# Patient Record
Sex: FEMALE | Race: WHITE | Employment: FULL TIME | URBAN - METROPOLITAN AREA
[De-identification: names, ages, dates, MRNs, and addresses within clinical notes are randomized per-mention and may not be internally consistent; named-entity substitution may affect disease eponyms.]

---

## 2020-08-29 ENCOUNTER — HOSPITAL ENCOUNTER (OUTPATIENT)
Dept: GENERAL RADIOLOGY | Age: 25
Discharge: HOME | End: 2020-08-31
Payer: COMMERCIAL

## 2020-08-29 ENCOUNTER — HOSPITAL ENCOUNTER (OUTPATIENT)
Age: 25
Discharge: HOME | End: 2020-08-31
Payer: COMMERCIAL

## 2020-08-29 PROCEDURE — 70220 X-RAY EXAM OF SINUSES: CPT

## 2020-11-06 ENCOUNTER — OFFICE VISIT (OUTPATIENT)
Dept: SURGERY | Age: 25
End: 2020-11-06
Payer: COMMERCIAL

## 2020-11-06 VITALS
TEMPERATURE: 99.4 F | RESPIRATION RATE: 20 BRPM | BODY MASS INDEX: 28.13 KG/M2 | OXYGEN SATURATION: 97 % | DIASTOLIC BLOOD PRESSURE: 82 MMHG | SYSTOLIC BLOOD PRESSURE: 116 MMHG | HEIGHT: 69 IN | HEART RATE: 91 BPM | WEIGHT: 189.9 LBS

## 2020-11-06 PROCEDURE — 99204 OFFICE O/P NEW MOD 45 MIN: CPT | Performed by: PLASTIC SURGERY

## 2020-11-06 PROCEDURE — 1036F TOBACCO NON-USER: CPT | Performed by: PLASTIC SURGERY

## 2020-11-06 PROCEDURE — G8427 DOCREV CUR MEDS BY ELIG CLIN: HCPCS | Performed by: PLASTIC SURGERY

## 2020-11-06 PROCEDURE — G8484 FLU IMMUNIZE NO ADMIN: HCPCS | Performed by: PLASTIC SURGERY

## 2020-11-06 PROCEDURE — G8419 CALC BMI OUT NRM PARAM NOF/U: HCPCS | Performed by: PLASTIC SURGERY

## 2020-11-06 NOTE — PROGRESS NOTES
Department of Plastic Surgery - Adult  Attending Consult Note        CHIEF COMPLAINT: Breathing difficulties    History Obtained From:  patient    HISTORY OF PRESENT ILLNESS:                The patient is a 22 y.o. female who presents with concerns of difficulty breathing through her nose. It seems that both sides are restrictive. Patient also has cosmetic complaints about a dorsal hump and was hoping to have this addressed at the time of any indicated nasal airway surgery. The patient states she has been seen by an ear nose and throat specialist who recommended a septoplasty and bilateral inferior turbinectomy. She did not discuss any external nasal changes with the ENT. Patient states she has taken Flonase in the past, but did not tolerate it and currently uses Nasacort. She states she has a some improvement with this nasal steroid but still has significant airway compromise. Furthermore, she did have allergy testing indicating that she is allergic to a multitude of trees and their pollens. Patient has considered injections for this issue, but decided not to pursue it. Past Medical History:    Past Medical History:   Diagnosis Date    Known health problems: none     none per pt     Past Surgical History:    Past Surgical History:   Procedure Laterality Date    TONSILLECTOMY AND ADENOIDECTOMY      1997 per pt    WISDOM TOOTH EXTRACTION      2014 apx per pt     Current Medications:   No current facility-administered medications for this visit. Allergies:  Patient has no known allergies.     Social History:   Social History     Socioeconomic History    Marital status: Single     Spouse name: Not on file    Number of children: Not on file    Years of education: Not on file    Highest education level: Not on file   Occupational History    Not on file   Social Needs    Financial resource strain: Not on file    Food insecurity     Worry: Not on file     Inability: Not on file   [employer] needs     Medical: Not on file     Non-medical: Not on file   Tobacco Use    Smoking status: Never Smoker    Smokeless tobacco: Never Used   Substance and Sexual Activity    Alcohol use: No    Drug use: No    Sexual activity: Never   Lifestyle    Physical activity     Days per week: Not on file     Minutes per session: Not on file    Stress: Not on file   Relationships    Social connections     Talks on phone: Not on file     Gets together: Not on file     Attends [employer] service: Not on file     Active member of club or organization: Not on file     Attends meetings of clubs or organizations: Not on file     Relationship status: Not on file    Intimate partner violence     Fear of current or ex partner: Not on file     Emotionally abused: Not on file     Physically abused: Not on file     Forced sexual activity: Not on file   Other Topics Concern    Not on file   Social History Narrative    Not on file     Family History:   Family History   Problem Relation Age of Onset    No Known Problems Mother     No Known Problems Father        REVIEW OF SYSTEMS:    CONSTITUTIONAL:  negative for  fevers, chills, sweats and fatigue  EYES: negative for dipolpia or acute vision loss. RESPIRATORY:  negative for  dry cough, cough with sputum, dyspnea, wheezing and chest pain  CARDIOVASCULAR:  negative for  chest pain, dyspnea, palpitations, syncope  GASTROINTESTINAL:  negative for nausea, vomiting, change in bowel habits, diarrhea, constipation and abdominal pain  EXTREMITIES: negative for edema  MUSCULOSKELETAL: negative for muscle weakness  SKIN: negative for itching or rashes. BEHAVIOR/PSYCH:  negative for poor appetite, increased appetite, decreased sleep and poor concentration  NEURO: Occasional migraine headaches, sinus headaches.         PHYSICAL EXAM:    VITALS:  /82 (Site: Left Upper Arm, Position: Sitting, Cuff Size: Medium Adult)   Pulse 91   Temp 99.4 °F (37.4 °C) (Temporal)   Resp 20   Ht 5' 9\" (1.753 m)   Wt 189 lb 14.4 oz (86.1 kg)   LMP 10/26/2020   SpO2 97%   BMI 28.04 kg/m²       Physical Exam   Constitutional: She is oriented to person, place, and time. She appears well-developed and well-nourished. Neck: Normal range of motion. Neck supple. Cardiovascular: Normal rate, regular rhythm, normal heart sounds and intact distal pulses. Pulmonary/Chest: Effort normal and breath sounds normal.   Abdominal: Soft. Bowel sounds are normal. She exhibits no mass. No tenderness. Musculoskeletal: Normal range of motion. She exhibits no edema. Lymphadenopathy: She has no cervical adenopathy. Neurological: She is alert and oriented to person, place, and time. She has normal reflexes. Skin: Warm and dry. Neuro: Cranial nerves II-XII grossly intact. EXTERNAL NASAL SPECULUM EXAM : presence of deformity in the nasal area, Nasal bridge widened, Nasal Dorsum hump, Nasal Tip bulbous, Alae widened, Columella midline, Nasal Bones widened. Deviated septum by nasal speculum exam to the right side and an S shaped pattern  Turbinate hypertrophy and bilateral inferior turbinates  Airway reduction on the both side  Kenedy maneuver was airflow  No septal perforations or hematomas  Patency of nasal passages compromised  Degree of obstruction to the left nasal airway 75%  Degree of obstruction to the right nasal airway 75%      DATA:      Radiology Review: X-ray of sinuses indicating septal deviation. IMPRESSION/RECOMMENDATIONS:        Clinical evidence of bilateral turbinate hypertrophy and septal deviation. I would like a CT scan of the nasal's sinuses in order to determine the extent of these issues as well as possible septal spurs that may need to be addressed. Furthermore, the patient is a candidate for cosmetic improvement of the nose including reduction of the dorsal hump, narrowing the widened nasal bones, refining the tip and slightly elevating the tip.   Furthermore, the patient would benefit from narrowing of the alar cartilages. Patient states she is interested in the functional nasal improvement as well as the cosmetic improvement. We will prepare life size photographs to educate her on the goals of surgery. I would meet with her again once the CAT scan of the sinuses are performed to also further review this portion of her surgery. The procedure is medically necessary due to significant airway compromise    Life Size Photos Taken Chaperone present      This document is generated, in part, by voice recognition software and thus  syntax and grammatical errors are possible.     Rita Alcaraz  11:39 AM  11/10/2020

## 2020-11-19 ENCOUNTER — HOSPITAL ENCOUNTER (OUTPATIENT)
Dept: CT IMAGING | Age: 25
Discharge: HOME | End: 2020-11-21
Payer: COMMERCIAL

## 2020-11-19 PROCEDURE — 70488 CT MAXILLOFACIAL W/O & W/DYE: CPT

## 2020-11-19 PROCEDURE — 6360000004 HC RX CONTRAST MEDICATION: Performed by: RADIOLOGY

## 2020-11-19 RX ADMIN — IOPAMIDOL 75 ML: 755 INJECTION, SOLUTION INTRAVENOUS at 17:46

## 2020-11-20 PROBLEM — R87.612 LGSIL OF CERVIX OF UNDETERMINED SIGNIFICANCE: Status: ACTIVE | Noted: 2020-11-20

## 2020-11-20 PROBLEM — R87.810 CERVICAL HIGH RISK HPV (HUMAN PAPILLOMAVIRUS) TEST POSITIVE: Status: ACTIVE | Noted: 2020-11-20

## 2020-11-25 ENCOUNTER — TELEPHONE (OUTPATIENT)
Dept: SURGERY | Age: 25
End: 2020-11-25

## 2020-11-25 NOTE — TELEPHONE ENCOUNTER
Reviewed CT scan of face. Shows rightward septal deviation with septal spur. Septal deviation is obstructing 80% of the right airway. Furthermore, patient has bilateral inferior turbinate hypertrophy which further obstructs the airway to about 85% total on the right and 80% on the left. She does not exhibit any acute nasal bone fractures. We will proceed with authorizing a septoplasty with bilateral inferior turbinate resection in an effort to improve her nasal airway. This document is generated, in part, by voice recognition software and thus  syntax and grammatical errors are possible.     Lyn Estrella  12:05 PM  11/25/2020

## 2020-12-03 ENCOUNTER — TELEPHONE (OUTPATIENT)
Dept: SURGERY | Age: 25
End: 2020-12-03

## 2020-12-10 NOTE — TELEPHONE ENCOUNTER
I did document my CT findings on 11/25/2020 in a telephone encounter. We should be able to use this for the insurance company.     Tierra Mariscal

## 2020-12-23 ENCOUNTER — OFFICE VISIT (OUTPATIENT)
Dept: SURGERY | Age: 25
End: 2020-12-23
Payer: COMMERCIAL

## 2020-12-23 VITALS
BODY MASS INDEX: 26.66 KG/M2 | HEART RATE: 68 BPM | DIASTOLIC BLOOD PRESSURE: 66 MMHG | RESPIRATION RATE: 14 BRPM | TEMPERATURE: 98.4 F | HEIGHT: 69 IN | WEIGHT: 180 LBS | OXYGEN SATURATION: 98 % | SYSTOLIC BLOOD PRESSURE: 104 MMHG

## 2020-12-23 DIAGNOSIS — M95.0 NASAL DEFORMITY: Primary | ICD-10-CM

## 2020-12-23 PROCEDURE — 99212 OFFICE O/P EST SF 10 MIN: CPT | Performed by: PLASTIC SURGERY

## 2020-12-23 PROCEDURE — G8484 FLU IMMUNIZE NO ADMIN: HCPCS | Performed by: PLASTIC SURGERY

## 2020-12-23 PROCEDURE — G8419 CALC BMI OUT NRM PARAM NOF/U: HCPCS | Performed by: PLASTIC SURGERY

## 2020-12-23 PROCEDURE — 1036F TOBACCO NON-USER: CPT | Performed by: PLASTIC SURGERY

## 2020-12-23 PROCEDURE — G8427 DOCREV CUR MEDS BY ELIG CLIN: HCPCS | Performed by: PLASTIC SURGERY

## 2020-12-23 NOTE — PROGRESS NOTES
Subjective: Follow up today from initial presentation of restricted nasal airway. Denies fever, nausea, vomiting, leg pain or swelling, pain is absent. The patient has recently obtained a CT facial bone scan and presents today to have these images reviewed. The patient has trialed a 2-week duration of Flonase as well with no relief of symptomatology. They voice no changes in her previous symptoms of their restricted nasal airway since there initial office visit. PHYSICAL EXAM:    VITALS:  /66 (Site: Left Upper Arm, Position: Sitting, Cuff Size: Medium Adult)   Pulse 68   Temp 98.4 °F (36.9 °C) (Temporal)   Resp 14   Ht 5' 9\" (1.753 m)   Wt 180 lb (81.6 kg)   LMP 12/03/2020 (Approximate)   SpO2 98%   Breastfeeding No   BMI 26.58 kg/m²   CONSTITUTIONAL:  awake, alert, cooperative, no apparent distress, and appears stated age  LUNGS:  No increased work of breathing, good air exchange, clear to auscultation bilaterally, no crackles or wheezing  CARDIOVASCULAR:  Normal apical impulse, regular rate and rhythm, normal S1 and S2, no S3 or S4, and no murmur noted    Nasal exam remains unchanged from previous visit      DATA:      Radiology Review:    EXAMINATION:   CT OF THE FACE WITH AND WITHOUT CONTRAST 11/19/2020   TECHNIQUE:   CT of the face was performed with and without the administration of   intravenous contrast.  Multiplanar reformatted images are provided for   review. Dose modulation, iterative reconstruction, and/or weight based   adjustment of the mA/kV was utilized to reduce the radiation dose to as low   as reasonably achievable. COMPARISON:   None. HISTORY:   ORDERING SYSTEM PROVIDED HISTORY: Nasal deformity   FINDINGS:   PHARYNX/LARYNX: The palatine tonsils are normal in appearance.  The tongue is   normal in appearance.  The valleculae, epiglottis, aryepiglottic folds and   pyriform sinuses appear unremarkable.  No mass or abscess is seen.    SALIVARY GLANDS: The parotid and submandibular glands appear unremarkable. LYMPH NODES: No cervical lymphadenopathy is seen. SOFT TISSUES: No appreciable soft tissue swelling or mass is seen. BRAIN/ORBITS/SINUSES: The visualized portion of the intracranial contents   appear unremarkable.  The visualized portion of the orbits, paranasal sinuses   and mastoid air cells demonstrate no acute abnormality.  The nasal bones are   intact.  Slight deviation of the bony septum to the right.  Small defect in   the posterior nasal septum. BONES:  No acute osseous abnormality is seen.       Impression   No acute abnormality of the face. The nasal bones are intact. IMPRESSION/RECOMMENDATIONS:      Diagnosis -nasal airway obstruction, hypertrophy of bilateral inferior turbinates, unacceptable cosmetic appearance of nose    The procedure is medically necessary due to nasal airway compromise refractory to nasal sprays including Flonase      As the patient has tried and failed conservative therapy for the restricted nasal airway including a duration of Flonase for greater than 8 weeks I believe the patient would benefit from the below described. As the patient has had prolonged persistent obstructed nasal breathing due to nasal bone and septal deviation that are the primary causes of anatomic mechanical airway obstruction and the nasal airway obstruction cannot be corrected by septoplasty alone as documented in the medical record I believe surgical intervention would be most optimal for their corrective procedure. The patient's physical exam and photos clearly document the nasal bone and septal deviation as the primary cause of anatomic mechanical nasal airway obstruction and are consistent with the clinical exam as detailed above.     The described procedure below will be designed to correct the atomic goal mechanical nasal airway obstruction and relieve the nasal airway obstruction by centralizing nasal bone pyramid, also straightening the septum. As the patient has had: A history of nasal fracture with nasal bone displacement severe enough to cause nasal airway obstruction, history of nasal obstruction causing significant symptoms including their history of chronic rhinosinusitis and difficulty breathing as well as persistent symptoms despite conservative management for greater than 4 weeks. Procedure: Cosmetic rhinoplasty with medically necessary septoplasty and resection of enlarged bilateral inferior turbinates    I did review life size photographs with the patient indicating the peripheral post surgical plan. Patient understands this is in no way guarantee of results. The patient voices understanding they will plan to follow-up in our office after completion of prior authorization for life size photo review. The visit lasted greater than 25 minutes, with 50% of the time in counseling, education, review of the case, and coordination of care. The risks, benefits and options were discussed with the pt. The risks included but not limited to pain, bleeding, infection, heavy scarring, damage to surrounding structures, fluid collections, asymmetry, and need for further procedures. All of Her questions were answered to her satisfaction and She agrees to proceed with the operation. Follow Up after completion of prior authorization. This document is generated, in part, by voice recognition software and thus  syntax and grammatical errors are possible.     Flavia Diez  10:48 AM  1/6/2021

## 2020-12-29 NOTE — TELEPHONE ENCOUNTER
Surgery has been scheduled on 1/11/2021 at [facility] [place], [address], [place] [place]. Pre-Admission Testing will call you prior to surgery to inform you arrival time and any other additional directions, if they are unable to reach you, please call them two days prior at [phone]. If taking Fish Oil, Vitamins, two weeks prior to surgery stop taking. If taking NSAIDS (such as Aspirin, Ibuprofen)  anticoagulants please consult with your prescribing physician to get further instructions on when to stop medication prior to surgery that is scheduled, patient understood. Pre-Auth #: for CPT [id number] - approved auth # [id number], for CPT [id number] no auth needed ref # [id number]  CPT Codes: F5386015, 40310  ICD-10 Codes: J34.2, R09.81, J34.3, M95.0    [employer] paid 12/28/2020.

## 2021-01-04 NOTE — H&P
Department of Plastic Surgery - Adult   Attending Consult Note   CHIEF COMPLAINT: Breathing difficulties   History Obtained From: patient   HISTORY OF PRESENT ILLNESS:   The patient is a 22 y.o. female who presents with concerns of difficulty breathing through her nose. It seems that both sides are restrictive. Patient also has cosmetic complaints about a dorsal hump and was hoping to have this addressed at the time of any indicated nasal airway surgery. The patient states she has been seen by an ear nose and throat specialist who recommended a septoplasty and bilateral inferior turbinectomy. She did not discuss any external nasal changes with the ENT. Patient states she has taken Flonase in the past, but did not tolerate it and currently uses Nasacort. She states she has a some improvement with this nasal steroid but still has significant airway compromise. Furthermore, she did have allergy testing indicating that she is allergic to a multitude of trees and their pollens. Patient has considered injections for this issue, but decided not to pursue it. Past Medical History:   Past Medical History        Past Medical History:   Diagnosis Date    Known health problems: none     none per pt   Past Surgical History:   Past Surgical History                                            Current Medications:   Current Hospital Medications      Allergies: Patient has no known allergies. Social History:   Social History                                                                                                                                                                                                                                                                                Family History:   Family History                                  REVIEW OF SYSTEMS:   CONSTITUTIONAL: negative for fevers, chills, sweats and fatigue   EYES: negative for dipolpia or acute vision loss.    RESPIRATORY: negative for dry cough, cough with sputum, dyspnea, wheezing and chest pain   CARDIOVASCULAR: negative for chest pain, dyspnea, palpitations, syncope   GASTROINTESTINAL: negative for nausea, vomiting, change in bowel habits, diarrhea, constipation and abdominal pain   EXTREMITIES: negative for edema   MUSCULOSKELETAL: negative for muscle weakness   SKIN: negative for itching or rashes. BEHAVIOR/PSYCH: negative for poor appetite, increased appetite, decreased sleep and poor concentration   NEURO: Occasional migraine headaches, sinus headaches. PHYSICAL EXAM:   VITALS: /82 (Site: Left Upper Arm, Position: Sitting, Cuff Size: Medium Adult)  Pulse 91  Temp 99.4 °F (37.4 °C) (Temporal)  Resp 20  Ht 5' 9\" (1.753 m)  Wt 189 lb 14.4 oz (86.1 kg)  LMP 10/26/2020  SpO2 97%  BMI 28.04 kg/m²   Physical Exam   Constitutional: She is oriented to person, place, and time. She appears well-developed and well-nourished. Neck: Normal range of motion. Neck supple. Cardiovascular: Normal rate, regular rhythm, normal heart sounds and intact distal pulses. Pulmonary/Chest: Effort normal and breath sounds normal.   Abdominal: Soft. Bowel sounds are normal. She exhibits no mass. No tenderness. Musculoskeletal: Normal range of motion. She exhibits no edema. Lymphadenopathy: She has no cervical adenopathy. Neurological: She is alert and oriented to person, place, and time. She has normal reflexes. Skin: Warm and dry. Neuro: Cranial nerves II-XII grossly intact. EXTERNAL NASAL SPECULUM EXAM : presence of deformity in the nasal area, Nasal bridge widened, Nasal Dorsum hump, Nasal Tip bulbous, Alae widened, Columella midline, Nasal Bones widened.    Deviated septum by nasal speculum exam to the right side and an S shaped pattern   Turbinate hypertrophy and bilateral inferior turbinates   Airway reduction on the both side   Nadia maneuver was airflow   No septal perforations or hematomas   Patency of nasal passages compromised Degree of obstruction to the left nasal airway 75%   Degree of obstruction to the right nasal airway 75%   DATA:   Radiology Review: X-ray of sinuses indicating septal deviation. IMPRESSION/RECOMMENDATIONS:     Reviewed CT scan of face. Shows rightward septal deviation with septal spur. Septal deviation is obstructing 80% of the right airway. Furthermore, patient has bilateral inferior turbinate hypertrophy which further obstructs the airway to about 85% total on the right and 80% on the left. She does not exhibit any acute nasal bone fractures. Clinical evidence of bilateral turbinate hypertrophy and septal deviation. Furthermore, the patient is a candidate for cosmetic improvement of the nose including reduction of the dorsal hump, narrowing the widened nasal bones, refining the tip and slightly elevating the tip. Furthermore, the patient would benefit from narrowing of the alar cartilages. Patient states she is interested in the functional nasal improvement as well as the cosmetic improvement. The risks, benefits and options were discussed with the pt. The risks included but not limited to pain, bleeding, infection, heavy scarring, damage to surrounding structures, fluid collections, asymmetry, and need for further procedures. All of Her questions were answered to her satisfaction and She agrees to proceed with the operation. The procedure is medically necessary due to significant airway compromise   Life Size Photos Taken Chaperone present   This document is generated, in part, by voice recognition software and thus syntax and grammatical errors are possible. Attestation    No change in patient's H & P. I have reviewed the procedure with the patient as well as the risk and benefits. They have no further questions and agree to proceed with surgery.     Shell Fiore MD   8:27 AM  1/11/2021    Attestation    No change in patient's H & P. I have reviewed the procedure with the patient as well as the risk and benefits. They have no further questions and agree to proceed with surgery.     Deb Bautista MD   9:54 AM  1/14/2021

## 2021-01-05 ENCOUNTER — HOSPITAL ENCOUNTER (OUTPATIENT)
Age: 26
Discharge: HOME | End: 2021-01-07
Payer: COMMERCIAL

## 2021-01-05 PROCEDURE — U0003 INFECTIOUS AGENT DETECTION BY NUCLEIC ACID (DNA OR RNA); SEVERE ACUTE RESPIRATORY SYNDROME CORONAVIRUS 2 (SARS-COV-2) (CORONAVIRUS DISEASE [COVID-19]), AMPLIFIED PROBE TECHNIQUE, MAKING USE OF HIGH THROUGHPUT TECHNOLOGIES AS DESCRIBED BY CMS-2020-01-R: HCPCS

## 2021-01-06 DIAGNOSIS — J34.2 NASAL SEPTAL DEVIATION: ICD-10-CM

## 2021-01-07 LAB
SARS-COV-2: NOT DETECTED
SOURCE: NORMAL

## 2021-01-08 ENCOUNTER — ANESTHESIA EVENT (OUTPATIENT)
Dept: OPERATING ROOM | Age: 26
End: 2021-01-08
Payer: COMMERCIAL

## 2021-01-08 NOTE — ANESTHESIA PRE PROCEDURE
Department of Anesthesiology  Preprocedure Note       Name:  Yoly Barnett   Age:  22 y.o.  :  1995                                          MRN:  [id number]         Date:  2021      Surgeon: Marquita Mascorro):  Jose Roberto Chavez MD    Procedure: Procedure(s):  BILATERAL INFERIOR TURBINOPLASTY, SEPTOPLASTY AND COSMETIC RHINOPLASTY (CPT 84816,75108,56269)  SEPTOPLASTY TURBINOPLASTY    Medications prior to admission:   Prior to Admission medications    Medication Sig Start Date End Date Taking? Authorizing Provider   TRI Garlon Joel 0.18/0.215/0.25 MG-35 MCG TABS TAKE 1 TABLET BY MOUTH EVERY DAY 20   Soledad Meyer MD       Current medications:    No current facility-administered medications for this encounter. Current Outpatient Medications   Medication Sig Dispense Refill    TRI FEMYNOR 0.18/0.215/0.25 MG-35 MCG TABS TAKE 1 TABLET BY MOUTH EVERY DAY 84 tablet 0       Allergies:  No Known Allergies    Problem List:    Patient Active Problem List   Diagnosis Code    Cervical high risk HPV (human papillomavirus) test positive R87.810    LGSIL of cervix of undetermined significance R87.612       Past Medical History:        Diagnosis Date    Known health problems: none     none per pt    PONV (postoperative nausea and vomiting)     with anesthesia       Past Surgical History:        Procedure Laterality Date    TONSILLECTOMY AND ADENOIDECTOMY  age 2    1 per pt    WISDOM TOOTH EXTRACTION       apx per pt       Social History:    Social History     Tobacco Use    Smoking status: Never Smoker    Smokeless tobacco: Never Used   Substance Use Topics    Alcohol use:  No                                Counseling given: Not Answered      Vital Signs (Current):   Vitals:    21 1314   Weight: 180 lb (81.6 kg)   Height: 5' 9\" (1.753 m)                                              BP Readings from Last 3 Encounters:   20 104/66   20 111/78   20 116/82       NPO Status: Anesthetic plan and risks discussed with patient. Plan discussed with CRNA. Plan for PONV prophylaxis discussed with patient.       B-HCG negative    Daylin Narayan MD   1/8/2021

## 2021-01-11 ENCOUNTER — HOSPITAL ENCOUNTER (OUTPATIENT)
Age: 26
Setting detail: OUTPATIENT SURGERY
Discharge: HOME | End: 2021-01-11
Attending: PLASTIC SURGERY | Admitting: PLASTIC SURGERY
Payer: COMMERCIAL

## 2021-01-11 ENCOUNTER — ANESTHESIA (OUTPATIENT)
Dept: OPERATING ROOM | Age: 26
End: 2021-01-11
Payer: COMMERCIAL

## 2021-01-11 VITALS
DIASTOLIC BLOOD PRESSURE: 78 MMHG | RESPIRATION RATE: 23 BRPM | OXYGEN SATURATION: 100 % | SYSTOLIC BLOOD PRESSURE: 122 MMHG

## 2021-01-11 VITALS
BODY MASS INDEX: 28.36 KG/M2 | DIASTOLIC BLOOD PRESSURE: 94 MMHG | HEIGHT: 69 IN | HEART RATE: 86 BPM | WEIGHT: 191.5 LBS | OXYGEN SATURATION: 97 % | RESPIRATION RATE: 14 BRPM | TEMPERATURE: 98.6 F | SYSTOLIC BLOOD PRESSURE: 136 MMHG

## 2021-01-11 DIAGNOSIS — G89.18 POST-OP PAIN: ICD-10-CM

## 2021-01-11 DIAGNOSIS — J34.2 NASAL SEPTAL DEVIATION: Primary | ICD-10-CM

## 2021-01-11 LAB
HCG, URINE, POC: NEGATIVE
Lab: NORMAL
NEGATIVE QC PASS/FAIL: NORMAL
POSITIVE QC PASS/FAIL: NORMAL

## 2021-01-11 PROCEDURE — 6370000000 HC RX 637 (ALT 250 FOR IP): Performed by: PLASTIC SURGERY

## 2021-01-11 PROCEDURE — 7100000010 HC PHASE II RECOVERY - FIRST 15 MIN: Performed by: PLASTIC SURGERY

## 2021-01-11 PROCEDURE — 3700000000 HC ANESTHESIA ATTENDED CARE: Performed by: PLASTIC SURGERY

## 2021-01-11 PROCEDURE — 7100000000 HC PACU RECOVERY - FIRST 15 MIN: Performed by: PLASTIC SURGERY

## 2021-01-11 PROCEDURE — 88311 DECALCIFY TISSUE: CPT

## 2021-01-11 PROCEDURE — 2720000010 HC SURG SUPPLY STERILE: Performed by: PLASTIC SURGERY

## 2021-01-11 PROCEDURE — 2709999900 HC NON-CHARGEABLE SUPPLY: Performed by: PLASTIC SURGERY

## 2021-01-11 PROCEDURE — 2580000003 HC RX 258: Performed by: PHYSICIAN ASSISTANT

## 2021-01-11 PROCEDURE — 6370000000 HC RX 637 (ALT 250 FOR IP): Performed by: ANESTHESIOLOGY

## 2021-01-11 PROCEDURE — 6360000002 HC RX W HCPCS: Performed by: STUDENT IN AN ORGANIZED HEALTH CARE EDUCATION/TRAINING PROGRAM

## 2021-01-11 PROCEDURE — 30520 REPAIR OF NASAL SEPTUM: CPT | Performed by: PLASTIC SURGERY

## 2021-01-11 PROCEDURE — 2500000003 HC RX 250 WO HCPCS: Performed by: NURSE ANESTHETIST, CERTIFIED REGISTERED

## 2021-01-11 PROCEDURE — 7100000011 HC PHASE II RECOVERY - ADDTL 15 MIN: Performed by: PLASTIC SURGERY

## 2021-01-11 PROCEDURE — 88304 TISSUE EXAM BY PATHOLOGIST: CPT

## 2021-01-11 PROCEDURE — 81025 URINE PREGNANCY TEST: CPT | Performed by: PLASTIC SURGERY

## 2021-01-11 PROCEDURE — 3600000014 HC SURGERY LEVEL 4 ADDTL 15MIN: Performed by: PLASTIC SURGERY

## 2021-01-11 PROCEDURE — 6360000002 HC RX W HCPCS: Performed by: NURSE ANESTHETIST, CERTIFIED REGISTERED

## 2021-01-11 PROCEDURE — 2500000003 HC RX 250 WO HCPCS: Performed by: PLASTIC SURGERY

## 2021-01-11 PROCEDURE — 3600000004 HC SURGERY LEVEL 4 BASE: Performed by: PLASTIC SURGERY

## 2021-01-11 PROCEDURE — 7100000001 HC PACU RECOVERY - ADDTL 15 MIN: Performed by: PLASTIC SURGERY

## 2021-01-11 PROCEDURE — 2580000003 HC RX 258: Performed by: PLASTIC SURGERY

## 2021-01-11 PROCEDURE — 3700000001 HC ADD 15 MINUTES (ANESTHESIA): Performed by: PLASTIC SURGERY

## 2021-01-11 PROCEDURE — 30130 EXCISE INFERIOR TURBINATE: CPT | Performed by: PLASTIC SURGERY

## 2021-01-11 PROCEDURE — 6360000002 HC RX W HCPCS: Performed by: PLASTIC SURGERY

## 2021-01-11 PROCEDURE — 2580000003 HC RX 258: Performed by: ANESTHESIOLOGY

## 2021-01-11 RX ORDER — PROPOFOL 10 MG/ML
INJECTION, EMULSION INTRAVENOUS CONTINUOUS PRN
Status: DISCONTINUED | OUTPATIENT
Start: 2021-01-11 | End: 2021-01-11 | Stop reason: SDUPTHER

## 2021-01-11 RX ORDER — SODIUM CHLORIDE 9 MG/ML
INJECTION, SOLUTION INTRAVENOUS CONTINUOUS
Status: DISCONTINUED | OUTPATIENT
Start: 2021-01-11 | End: 2021-01-11 | Stop reason: HOSPADM

## 2021-01-11 RX ORDER — LIDOCAINE HYDROCHLORIDE 20 MG/ML
INJECTION, SOLUTION INTRAVENOUS PRN
Status: DISCONTINUED | OUTPATIENT
Start: 2021-01-11 | End: 2021-01-11 | Stop reason: SDUPTHER

## 2021-01-11 RX ORDER — OXYMETAZOLINE HYDROCHLORIDE 0.05 G/100ML
SPRAY NASAL PRN
Status: DISCONTINUED | OUTPATIENT
Start: 2021-01-11 | End: 2021-01-11 | Stop reason: ALTCHOICE

## 2021-01-11 RX ORDER — HYDROCODONE BITARTRATE AND ACETAMINOPHEN 5; 325 MG/1; MG/1
1 TABLET ORAL EVERY 6 HOURS PRN
Qty: 28 TABLET | Refills: 0 | Status: SHIPPED | OUTPATIENT
Start: 2021-01-11 | End: 2021-01-18

## 2021-01-11 RX ORDER — SODIUM CHLORIDE 0.9 % (FLUSH) 0.9 %
10 SYRINGE (ML) INJECTION PRN
Status: DISCONTINUED | OUTPATIENT
Start: 2021-01-11 | End: 2021-01-11 | Stop reason: HOSPADM

## 2021-01-11 RX ORDER — SCOLOPAMINE TRANSDERMAL SYSTEM 1 MG/1
1 PATCH, EXTENDED RELEASE TRANSDERMAL
Status: DISCONTINUED | OUTPATIENT
Start: 2021-01-11 | End: 2021-01-11 | Stop reason: HOSPADM

## 2021-01-11 RX ORDER — DEXAMETHASONE SODIUM PHOSPHATE 10 MG/ML
INJECTION, SOLUTION INTRAMUSCULAR; INTRAVENOUS PRN
Status: DISCONTINUED | OUTPATIENT
Start: 2021-01-11 | End: 2021-01-11 | Stop reason: SDUPTHER

## 2021-01-11 RX ORDER — CEPHALEXIN 500 MG/1
500 CAPSULE ORAL 4 TIMES DAILY
Qty: 28 CAPSULE | Refills: 0 | Status: SHIPPED | OUTPATIENT
Start: 2021-01-11 | End: 2021-01-18

## 2021-01-11 RX ORDER — PROPOFOL 10 MG/ML
INJECTION, EMULSION INTRAVENOUS PRN
Status: DISCONTINUED | OUTPATIENT
Start: 2021-01-11 | End: 2021-01-11 | Stop reason: SDUPTHER

## 2021-01-11 RX ORDER — ROCURONIUM BROMIDE 10 MG/ML
INJECTION, SOLUTION INTRAVENOUS PRN
Status: DISCONTINUED | OUTPATIENT
Start: 2021-01-11 | End: 2021-01-11 | Stop reason: SDUPTHER

## 2021-01-11 RX ORDER — MIDAZOLAM HYDROCHLORIDE 1 MG/ML
INJECTION INTRAMUSCULAR; INTRAVENOUS PRN
Status: DISCONTINUED | OUTPATIENT
Start: 2021-01-11 | End: 2021-01-11 | Stop reason: SDUPTHER

## 2021-01-11 RX ORDER — SODIUM CHLORIDE 0.9 % (FLUSH) 0.9 %
10 SYRINGE (ML) INJECTION EVERY 12 HOURS SCHEDULED
Status: DISCONTINUED | OUTPATIENT
Start: 2021-01-11 | End: 2021-01-11 | Stop reason: HOSPADM

## 2021-01-11 RX ORDER — PROMETHAZINE HYDROCHLORIDE 25 MG/ML
6.25 INJECTION, SOLUTION INTRAMUSCULAR; INTRAVENOUS
Status: DISCONTINUED | OUTPATIENT
Start: 2021-01-11 | End: 2021-01-11 | Stop reason: HOSPADM

## 2021-01-11 RX ORDER — MEPERIDINE HYDROCHLORIDE 25 MG/ML
12.5 INJECTION INTRAMUSCULAR; INTRAVENOUS; SUBCUTANEOUS EVERY 5 MIN PRN
Status: DISCONTINUED | OUTPATIENT
Start: 2021-01-11 | End: 2021-01-11 | Stop reason: HOSPADM

## 2021-01-11 RX ORDER — SODIUM CHLORIDE, SODIUM LACTATE, POTASSIUM CHLORIDE, CALCIUM CHLORIDE 600; 310; 30; 20 MG/100ML; MG/100ML; MG/100ML; MG/100ML
INJECTION, SOLUTION INTRAVENOUS CONTINUOUS
Status: DISCONTINUED | OUTPATIENT
Start: 2021-01-11 | End: 2021-01-11 | Stop reason: HOSPADM

## 2021-01-11 RX ORDER — HYDRALAZINE HYDROCHLORIDE 20 MG/ML
5 INJECTION INTRAMUSCULAR; INTRAVENOUS EVERY 10 MIN PRN
Status: DISCONTINUED | OUTPATIENT
Start: 2021-01-11 | End: 2021-01-11 | Stop reason: HOSPADM

## 2021-01-11 RX ORDER — CEFAZOLIN SODIUM 1 G/3ML
2 INJECTION, POWDER, FOR SOLUTION INTRAMUSCULAR; INTRAVENOUS
Status: COMPLETED | OUTPATIENT
Start: 2021-01-11 | End: 2021-01-11

## 2021-01-11 RX ORDER — DIPHENHYDRAMINE HYDROCHLORIDE 50 MG/ML
INJECTION INTRAMUSCULAR; INTRAVENOUS PRN
Status: DISCONTINUED | OUTPATIENT
Start: 2021-01-11 | End: 2021-01-11 | Stop reason: SDUPTHER

## 2021-01-11 RX ORDER — LABETALOL HYDROCHLORIDE 5 MG/ML
5 INJECTION, SOLUTION INTRAVENOUS EVERY 10 MIN PRN
Status: DISCONTINUED | OUTPATIENT
Start: 2021-01-11 | End: 2021-01-11 | Stop reason: HOSPADM

## 2021-01-11 RX ORDER — NEOSTIGMINE METHYLSULFATE 1 MG/ML
INJECTION, SOLUTION INTRAVENOUS PRN
Status: DISCONTINUED | OUTPATIENT
Start: 2021-01-11 | End: 2021-01-11 | Stop reason: SDUPTHER

## 2021-01-11 RX ORDER — FENTANYL CITRATE 50 UG/ML
INJECTION, SOLUTION INTRAMUSCULAR; INTRAVENOUS PRN
Status: DISCONTINUED | OUTPATIENT
Start: 2021-01-11 | End: 2021-01-11 | Stop reason: SDUPTHER

## 2021-01-11 RX ORDER — ONDANSETRON 4 MG/1
4 TABLET, ORALLY DISINTEGRATING ORAL EVERY 8 HOURS PRN
Qty: 10 TABLET | Refills: 0 | Status: SHIPPED | OUTPATIENT
Start: 2021-01-11 | End: 2021-04-05 | Stop reason: ALTCHOICE

## 2021-01-11 RX ORDER — OXYCODONE HYDROCHLORIDE AND ACETAMINOPHEN 5; 325 MG/1; MG/1
1 TABLET ORAL
Status: COMPLETED | OUTPATIENT
Start: 2021-01-11 | End: 2021-01-11

## 2021-01-11 RX ORDER — CEFAZOLIN SODIUM 1 G/3ML
2 INJECTION, POWDER, FOR SOLUTION INTRAMUSCULAR; INTRAVENOUS
Status: DISCONTINUED | OUTPATIENT
Start: 2021-01-11 | End: 2021-01-11

## 2021-01-11 RX ORDER — ONDANSETRON 2 MG/ML
INJECTION INTRAMUSCULAR; INTRAVENOUS PRN
Status: DISCONTINUED | OUTPATIENT
Start: 2021-01-11 | End: 2021-01-11 | Stop reason: SDUPTHER

## 2021-01-11 RX ORDER — GLYCOPYRROLATE 1 MG/5 ML
SYRINGE (ML) INTRAVENOUS PRN
Status: DISCONTINUED | OUTPATIENT
Start: 2021-01-11 | End: 2021-01-11 | Stop reason: SDUPTHER

## 2021-01-11 RX ADMIN — DIPHENHYDRAMINE HYDROCHLORIDE 12.5 MG: 50 INJECTION, SOLUTION INTRAMUSCULAR; INTRAVENOUS at 10:38

## 2021-01-11 RX ADMIN — FENTANYL CITRATE 100 MCG: 50 INJECTION, SOLUTION INTRAMUSCULAR; INTRAVENOUS at 08:58

## 2021-01-11 RX ADMIN — MIDAZOLAM 2 MG: 1 INJECTION INTRAMUSCULAR; INTRAVENOUS at 08:26

## 2021-01-11 RX ADMIN — LIDOCAINE HYDROCHLORIDE 50 MG: 20 INJECTION, SOLUTION INTRAVENOUS at 08:32

## 2021-01-11 RX ADMIN — PROPOFOL 200 MG: 10 INJECTION, EMULSION INTRAVENOUS at 08:32

## 2021-01-11 RX ADMIN — Medication 0.6 MG: at 11:24

## 2021-01-11 RX ADMIN — DEXAMETHASONE SODIUM PHOSPHATE 14 MG: 10 INJECTION, SOLUTION INTRAMUSCULAR; INTRAVENOUS at 08:44

## 2021-01-11 RX ADMIN — CEFAZOLIN 2 G: 1 INJECTION, POWDER, FOR SOLUTION INTRAMUSCULAR; INTRAVENOUS at 08:25

## 2021-01-11 RX ADMIN — ROCURONIUM BROMIDE 50 MG: 10 SOLUTION INTRAVENOUS at 08:32

## 2021-01-11 RX ADMIN — FENTANYL CITRATE 100 MCG: 50 INJECTION, SOLUTION INTRAMUSCULAR; INTRAVENOUS at 08:32

## 2021-01-11 RX ADMIN — ROCURONIUM BROMIDE 10 MG: 10 SOLUTION INTRAVENOUS at 10:10

## 2021-01-11 RX ADMIN — Medication 3 MG: at 11:24

## 2021-01-11 RX ADMIN — PROPOFOL INJECTABLE EMULSION 50 MCG/KG/MIN: 10 INJECTION, EMULSION INTRAVENOUS at 08:44

## 2021-01-11 RX ADMIN — FENTANYL CITRATE 50 MCG: 50 INJECTION, SOLUTION INTRAMUSCULAR; INTRAVENOUS at 08:29

## 2021-01-11 RX ADMIN — SODIUM CHLORIDE, POTASSIUM CHLORIDE, SODIUM LACTATE AND CALCIUM CHLORIDE: 600; 310; 30; 20 INJECTION, SOLUTION INTRAVENOUS at 08:02

## 2021-01-11 RX ADMIN — SODIUM CHLORIDE: 9 INJECTION, SOLUTION INTRAVENOUS at 09:00

## 2021-01-11 RX ADMIN — OXYCODONE HYDROCHLORIDE AND ACETAMINOPHEN 1 TABLET: 5; 325 TABLET ORAL at 12:33

## 2021-01-11 RX ADMIN — ONDANSETRON 4 MG: 2 INJECTION INTRAMUSCULAR; INTRAVENOUS at 10:49

## 2021-01-11 ASSESSMENT — PULMONARY FUNCTION TESTS
PIF_VALUE: 14
PIF_VALUE: 15
PIF_VALUE: 15
PIF_VALUE: 16
PIF_VALUE: 15
PIF_VALUE: 16
PIF_VALUE: 15
PIF_VALUE: 16
PIF_VALUE: 14
PIF_VALUE: 16
PIF_VALUE: 14
PIF_VALUE: 15
PIF_VALUE: 16
PIF_VALUE: 15
PIF_VALUE: 14
PIF_VALUE: 16
PIF_VALUE: 1
PIF_VALUE: 1
PIF_VALUE: 17
PIF_VALUE: 15
PIF_VALUE: 1
PIF_VALUE: 15
PIF_VALUE: 14
PIF_VALUE: 16
PIF_VALUE: 15
PIF_VALUE: 17
PIF_VALUE: 14
PIF_VALUE: 1
PIF_VALUE: 15
PIF_VALUE: 14
PIF_VALUE: 14
PIF_VALUE: 16
PIF_VALUE: 16
PIF_VALUE: 15
PIF_VALUE: 14
PIF_VALUE: 16
PIF_VALUE: 17
PIF_VALUE: 5
PIF_VALUE: 16
PIF_VALUE: 16
PIF_VALUE: 14
PIF_VALUE: 16
PIF_VALUE: 14
PIF_VALUE: 15
PIF_VALUE: 16
PIF_VALUE: 15
PIF_VALUE: 15
PIF_VALUE: 14
PIF_VALUE: 17
PIF_VALUE: 15
PIF_VALUE: 6
PIF_VALUE: 14
PIF_VALUE: 16
PIF_VALUE: 14
PIF_VALUE: 15
PIF_VALUE: 1
PIF_VALUE: 16
PIF_VALUE: 15
PIF_VALUE: 15
PIF_VALUE: 14
PIF_VALUE: 16
PIF_VALUE: 14
PIF_VALUE: 17
PIF_VALUE: 16
PIF_VALUE: 16
PIF_VALUE: 14
PIF_VALUE: 15
PIF_VALUE: 15
PIF_VALUE: 14
PIF_VALUE: 14
PIF_VALUE: 2
PIF_VALUE: 14
PIF_VALUE: 14
PIF_VALUE: 18
PIF_VALUE: 16
PIF_VALUE: 15
PIF_VALUE: 15
PIF_VALUE: 17
PIF_VALUE: 0
PIF_VALUE: 16
PIF_VALUE: 14
PIF_VALUE: 16
PIF_VALUE: 15
PIF_VALUE: 16
PIF_VALUE: 1
PIF_VALUE: 17
PIF_VALUE: 17
PIF_VALUE: 14
PIF_VALUE: 15
PIF_VALUE: 14
PIF_VALUE: 16
PIF_VALUE: 18
PIF_VALUE: 14

## 2021-01-11 ASSESSMENT — PAIN - FUNCTIONAL ASSESSMENT: PAIN_FUNCTIONAL_ASSESSMENT: 0-10

## 2021-01-11 ASSESSMENT — PAIN DESCRIPTION - PAIN TYPE: TYPE: SURGICAL PAIN

## 2021-01-11 ASSESSMENT — PAIN DESCRIPTION - LOCATION: LOCATION: NOSE

## 2021-01-11 ASSESSMENT — PAIN SCALES - GENERAL
PAINLEVEL_OUTOF10: 6
PAINLEVEL_OUTOF10: 6
PAINLEVEL_OUTOF10: 0

## 2021-01-11 NOTE — ANESTHESIA POSTPROCEDURE EVALUATION
Department of Anesthesiology  Postprocedure Note    Patient: Surinder Gomez  MRN: [id number]  YOB: 1995  Date of evaluation: 1/11/2021  Time:  2:40 PM     Procedure Summary     Date: 01/11/21 Room / Location: [address] [place] [address] 03 / [facility]    Anesthesia Start: 7455 Anesthesia Stop: 2056    Procedures:       BILATERAL INFERIOR TURBINOPLASTY, SEPTOPLASTY AND COSMETIC RHINOPLASTY (CPT 05482,87969,76761) (Bilateral Nose)      SEPTOPLASTY TURBINOPLASTY (Bilateral Nose) Diagnosis: (SEPTAL DEVIATION NASAL AIRWAY COMPROMISE, BILATERAL INFERIOR TURBINATE HYPERTROPHY)    Surgeons: Dorine Primrose, MD Responsible Provider: Carina Gomze MD    Anesthesia Type: general ASA Status: 1          Anesthesia Type: general    Gaurang Phase I: Gaurang Score: 10    Gaurang Phase II: Gaurang Score: 10    Last vitals: Reviewed and per EMR flowsheets.        Anesthesia Post Evaluation    Patient location during evaluation: PACU  Patient participation: complete - patient participated  Level of consciousness: awake and alert  Airway patency: patent  Nausea & Vomiting: no nausea and no vomiting  Complications: no  Cardiovascular status: hemodynamically stable  Respiratory status: acceptable  Hydration status: euvolemic

## 2021-01-11 NOTE — PROGRESS NOTES
Discharge instructions given, communicates understanding. VSS. Nasal drip pad changed once for bright red drainage. Nasal splint intact. Discharged home with family.

## 2021-01-11 NOTE — BRIEF OP NOTE
Brief Postoperative Note      Patient: Vero Noel  YOB: 1995  MRN: [id number]    Date of Procedure: 1/11/2021    Pre-Op Diagnosis: SEPTAL DEVIATION NASAL AIRWAY COMPROMISE, BILATERAL INFERIOR TURBINATE HYPERTROPHY    Post-Op Diagnosis: Same       Procedure(s):  BILATERAL INFERIOR TURBINOPLASTY, SEPTOPLASTY AND COSMETIC RHINOPLASTY (CPT 52996,61026,94819)  SEPTOPLASTY TURBINOPLASTY    Surgeon(s):  Flavia Diez MD    Assistant:  Resident: Dilip Blankenship DO    Anesthesia: General    Estimated Blood Loss (mL): less than 50    Complications: None    Specimens:   ID Type Source Tests Collected by Time Destination   A : SEPTAL BONE CARTLIAGE Nasal Nose SURGICAL PATHOLOGY Flavia Diez MD 1/11/2021 0950    B : TURBINATES Nasal Nose SURGICAL PATHOLOGY Flavia Diez MD 1/11/2021 1003        Implants:  * No implants in log *      Drains: * No LDAs found *    Findings: see op note     Electronically signed by Dilip Blankenship DO on 1/11/2021 at 12:09 PM

## 2021-01-12 NOTE — OP NOTE
[address]                                OPERATIVE REPORT    PATIENT NAME: Jaison Garcia               :        1995  MED REC NO:   [id number]                            ROOM:  ACCOUNT NO:   [de-identified]                           ADMIT DATE: 2021  PROVIDER:     Yamil Cortes DO    DATE OF PROCEDURE:  2021    SURGEON:  Vivek Swann MD    ASSISTANT:  Yamil Cortes DO, PGY-3    PREOPERATIVE DIAGNOSES:  Nasal airway compromise, turbinate hypertrophy  inferior, unacceptable nasal appearance. PROCEDURES PERFORMED:  Septoplasty, bilateral inferior turbinectomy,  cosmetic rhinoplasty. ANESTHESIA:  General.    ESTIMATED BLOOD LOSS:  25.    IV FLUIDS:  1800. COMPLICATIONS:  None. INSURANCE TIME:  09:45 am to 10:00 am.    HISTORY:  The patient is a 20-year-old female with nasal airway  compromise, inferior turbinate hypertrophy, and an unacceptable nasal  appearance. A septoplasty, bilateral inferior turbinectomy, and  cosmetic rhinoplasty were recommended. The risks, benefits, and  alternatives of the procedure were discussed and agreed upon. The  patient agreed to proceed with the procedure. DESCRIPTION OF PROCEDURE:  The patient was brought back to the operating  room and positioned supine on the OR table. SCDs were placed on the  patient's lower extremities and functioning. 2 gm of Ancef were given  intravenously. Anesthesia was obtained via anesthesia record without  complication. Immediately prior to procedure, time-out was called. Surgical site was agreed upon by all present. The patient was prepped  and draped in the usual sterile fashion. Procedure went forward with  strict aseptic technique, maximal barrier precautions.     Using curved iris scissors, the vibrissae were trimmed bilaterally for , quilting stitch. Dual splints in bacitracin  were then sutured with 4-0 Prolene. Osteotomes were performed- medial   with 4 mm straight, transverse with 2 mm lateral low-to-low guarded via  lateral wall incision with a #15 blade. The upper lateral cartilage was  then trimmed after medialization of the septum and symmetry of nasal  bones. The cartilage was shaped with a #10 blade for the appropriate  grafts,  grafts with double edges were placed with double-arm  6-0 Vicryl and 5-0 PDS. Subdomal grafts were then placed and sewed in  with 6-0 Vicryl. Alar rim grafts bilaterally were placed bilaterally. Next, a shield  graft was then sewn to the tip with 5-0 PDS  The remaining cartilage was then  diced to submillimeter pieces, placed in a syringe and injected  along the lateral wall of the dorsal hump; The stair-step incision  was then closed with 5-0 plain gut. The skin was cleaned and dried. Steri-Strips were placed first  transversely and then vertical and dorsal and the tip reinforced  inferiorly, transversely and Aquaplast splint was then placed. The  patient was then returned back to Anesthesia. The patient tolerated the  procedure well. There were no complications. Dr. Rigo Acuña was present for  the entire case. The patient will be observed in the PACU and then sent  home after recovery. The counts were correct at the end of the case.         Jacob Rust DO    D: 01/11/2021 22:31:30       T: 01/12/2021 0:58:03     [email or web address]  Job#: [id number]     Doc#: [id number]    CC:

## 2021-01-14 ENCOUNTER — OFFICE VISIT (OUTPATIENT)
Dept: SURGERY | Age: 26
End: 2021-01-14

## 2021-01-14 VITALS — TEMPERATURE: 96.8 F

## 2021-01-14 DIAGNOSIS — M95.0 NASAL DEFORMITY: Primary | ICD-10-CM

## 2021-01-14 PROCEDURE — 99024 POSTOP FOLLOW-UP VISIT: CPT | Performed by: PLASTIC SURGERY

## 2021-01-14 NOTE — PROGRESS NOTES
Subjective: Follow up today from cosmetic rhinoplasty, functional septoplasty/inferior turbinate reduction. Denies fever, nausea, vomiting, leg pain or swelling, pain is moderately controlled. The patient states that they have  been taking their antibiotic as directed. She notes swelling in her face and wonders if this is normal.  No issues with nasal splint or with epistaxis. Sleeping upright. Objective:    /80  Temp(Src) 97 °F (36.1 °C) (Tympanic)  Ht 5' 10\" (1.778 m)  Wt 205 lb (92.987 kg)  BMI 29.41 kg/m2      Nose: Nasal Splint intact, b/l nares with byrne splints intact. Crusting to nares, nasal tip capillary refill intact, sensation to light touch intact to nasal tip  Eyes: B/L periorbital edema and ecchymosis as expected            Assessment:    Patient Active Problem List   Diagnosis    Nasal deformity, acquired       Plan:     Continue ABX as directed  Pain Control PRN  No glasses of sunglass until 6 weeks post post  HOB elevated while at rest  Educated the patient on the extent of their surgery and that edema and ecchymosis are common. Nasal splint and b/l nasal byrne's to remain intact until next visit        F/U in 1 week. Call office with concerns or signs of infection. Electronically signed by Lior Nascimento DO on 1/14/2021 at 10:57 AM        Attending Physician Statement  I have discussed the case, including pertinent history and exam findings with the resident. I have seen and examined the patient and the key elements of all parts of the encounter have been performed by me. I agree with the assessment, exam, plan and orders as documented by the resident. I attest that the patient was seen and examined by me, and concur with the documentation above. I agree with the assessment and the plan outlined. This document is generated, in part, by voice recognition software and thus  syntax and grammatical errors are possible.     Selena Garcia

## 2021-01-20 ENCOUNTER — OFFICE VISIT (OUTPATIENT)
Dept: SURGERY | Age: 26
End: 2021-01-20

## 2021-01-20 VITALS
SYSTOLIC BLOOD PRESSURE: 104 MMHG | TEMPERATURE: 98.1 F | HEART RATE: 96 BPM | DIASTOLIC BLOOD PRESSURE: 78 MMHG | RESPIRATION RATE: 16 BRPM | HEIGHT: 69 IN | WEIGHT: 180 LBS | OXYGEN SATURATION: 98 % | BODY MASS INDEX: 26.66 KG/M2

## 2021-01-20 DIAGNOSIS — Z98.890 S/P RHINOPLASTY: Primary | ICD-10-CM

## 2021-01-20 PROCEDURE — 99024 POSTOP FOLLOW-UP VISIT: CPT | Performed by: PLASTIC SURGERY

## 2021-01-20 NOTE — PROGRESS NOTES
Subjective: Follow up today from cosmetic rhinoplasty, functional septoplasty/inferior turbinate reduction. Denies fever, nausea, vomiting, leg pain or swelling, pain is moderately controlled. The patient states that they are finished with their  antibiotic as directed. She notes swelling in her face and wonders if this is normal.  No issues with nasal splint or with epistaxis. Sleeping upright. Objective:    /80  Temp(Src) 97 °F (36.1 °C) (Tympanic)  Ht 5' 10\" (1.778 m)  Wt 205 lb (92.987 kg)  BMI 29.41 kg/m2      Nose: Nasal Splint intact, b/l nares with byrne splints intact. Crusting to nares, nasal tip capillary refill intact, sensation to light touch intact to nasal tip  Eyes: B/L periorbital edema and ecchymosis resolving    Upon removal of splint and Doyles patient exhibits a midline nose with optimal well-defined nasal tip. Resolution of dorsal nasal hump. Patient exhibits patent airways on initial exam.            Assessment:    Patient Active Problem List   Diagnosis    Nasal deformity, acquired       Plan:     Status post rhinoplasty    Pain Control PRN  No glasses of sunglass until 6 weeks post post  HOB elevated while at rest  Educated the patient on the extent of their surgery and that edema and ecchymosis are common. Nasal splint and b/l nasal byrne's removed today    Educated the patient on nasal tip taping. Chris Gut were given to the patient today she voices understanding on how to perform this at home. F/U 2 weeks      Call office with concerns or signs of infection. Electronically signed by Paul Quach MD on 1/20/2021 at 9:01 AM        This document is generated, in part, by voice recognition software and thus  syntax and grammatical errors are possible.     Paul Quach  12:49 PM  1/20/2021

## 2021-02-02 NOTE — PROGRESS NOTES
Subjective: Follow up today from cosmetic rhinoplasty, functional septoplasty/inferior turbinate reduction. Denies fever, nausea, vomiting, leg pain or swelling, pain is moderately controlled. The patient states that they are finished with their  antibiotic as directed. She notes swelling in her right nostril and wonders if this is normal.  No issues with nasal splint or with epistaxis. Sleeping upright. Objective:    /80  Temp(Src) 97 °F (36.1 °C) (Tympanic)  Ht 5' 10\" (1.778 m)  Wt 205 lb (92.987 kg)  BMI 29.41 kg/m2      Nose: No  Crusting to nares, right nare widened compared to left and tender nasal tip capillary refill intact, sensation to light touch intact to nasal tip  Eyes: B/L periorbital edema and ecchymosis resolving    U patient exhibits a midline nose with optimal defined nasal tip. Resolution of dorsal nasal hump. Patient exhibits patent airways on both sides          Assessment:    Patient Active Problem List   Diagnosis    Nasal deformity, acquired       Plan:     Status post rhinoplasty      No glasses of sunglass until 6 weeks post post  HOB elevated while at rest  Educated the patient on the extent of their surgery and that edema and ecchymosis are common. The patient is exhibiting more tenderness to the right nostril compared to the left with associated swelling creating an asymmetry. Stated that we should definitely wait to see how the tenderness and swelling resolve over time. I did discuss with her that it is possible that one of the grafts has shifted and may need a secondary procedure to address this. However it is very prudent to wait at this time. Nasal splint and b/l nasal byrne's removed today    Educated the patient on nasal tip taping. Isak Spells were given to the patient today she voices understanding on how to perform this at home.       F/U 4 weeks      This document is generated, in part, by voice recognition software and thus  syntax and grammatical errors

## 2021-02-03 ENCOUNTER — OFFICE VISIT (OUTPATIENT)
Dept: SURGERY | Age: 26
End: 2021-02-03

## 2021-02-03 VITALS
OXYGEN SATURATION: 98 % | HEIGHT: 69 IN | TEMPERATURE: 97.4 F | HEART RATE: 84 BPM | WEIGHT: 180 LBS | BODY MASS INDEX: 26.66 KG/M2

## 2021-02-03 DIAGNOSIS — Z98.890 S/P RHINOPLASTY: Primary | ICD-10-CM

## 2021-02-03 PROCEDURE — MISCPNC PLASTICS VISIT NO CHARGE: Performed by: PLASTIC SURGERY

## 2021-03-10 ENCOUNTER — OFFICE VISIT (OUTPATIENT)
Dept: SURGERY | Age: 26
End: 2021-03-10

## 2021-03-10 VITALS
OXYGEN SATURATION: 99 % | HEART RATE: 80 BPM | TEMPERATURE: 98 F | BODY MASS INDEX: 25.92 KG/M2 | WEIGHT: 175 LBS | HEIGHT: 69 IN

## 2021-03-10 DIAGNOSIS — M95.0 NASAL DEFORMITY: ICD-10-CM

## 2021-03-10 DIAGNOSIS — Z98.890 S/P RHINOPLASTY: Primary | ICD-10-CM

## 2021-03-10 DIAGNOSIS — R68.89 NASAL AIRWAY ABNORMALITY: ICD-10-CM

## 2021-03-10 PROCEDURE — 99024 POSTOP FOLLOW-UP VISIT: CPT | Performed by: PLASTIC SURGERY

## 2021-03-10 NOTE — PROGRESS NOTES
Subjective: Follow up today from up to plasty and inferior turbinectomy as well as rhinoplasty. Patient states her breathing has markedly improved but has some drainage from time to time but is improving. Patient is very satisfied with her AP and lateral view, however she does note some continued alar asymmetry. Objective:    Pulse 80   Temp 98 °F (36.7 °C) (Temporal)   Ht 5' 9\" (1.753 m)   Wt 175 lb (79.4 kg)   LMP 02/17/2021   SpO2 99%   Breastfeeding No   BMI 25.84 kg/m²       Wound: Clean, dry, and intact, no drainage. Optimal profile and AP view. On warms I view patient does demonstrate nostril asymmetry with a prominence of the medial crura of the right lower lateral cartilage and shifting of the septum from midline to the right nasla airway as well as some depression of the right nasal ala into the airway. Assessment:    Patient Active Problem List   Diagnosis    Cervical high risk HPV (human papillomavirus) test positive    LGSIL of cervix of undetermined significance    Post-op pain       Plan:     I have offered the patient a minor revision to her septum as it has shifted at the caudal part to the right thus displacing her medical jenny to the obstruct the right airway. I will also perform an exploration of the alar rim graft that was placed. Patient may require auricular cartilage grafting to stabilize the septum. I estimate this procedure to take about 30 minutes. The risks, benefits and options were discussed with the pt. The risks included but not limited to pain, bleeding, infection, heavy scarring, neurovascular injury, damage to surrounding structures, fluid collections, asymmetry, wound healing complications requiring dressing changes, heart attack, stroke, DVT, PE, contour irregularities, death, and need for further procedures. No guarantees were given. All of Her questions were answered to Her satisfaction and She agrees to proceed with the operation.       F/U day of surgery    Call office with concerns or signs of infection. Key Desai MD   1:46 PM  3/10/2021.

## 2021-03-15 ENCOUNTER — TELEPHONE (OUTPATIENT)
Dept: SURGERY | Age: 26
End: 2021-03-15

## 2021-04-07 ENCOUNTER — HOSPITAL ENCOUNTER (OUTPATIENT)
Age: 26
Discharge: HOME | End: 2021-04-09
Payer: COMMERCIAL

## 2021-04-07 DIAGNOSIS — R68.89 NASAL AIRWAY ABNORMALITY: ICD-10-CM

## 2021-04-07 PROCEDURE — U0003 INFECTIOUS AGENT DETECTION BY NUCLEIC ACID (DNA OR RNA); SEVERE ACUTE RESPIRATORY SYNDROME CORONAVIRUS 2 (SARS-COV-2) (CORONAVIRUS DISEASE [COVID-19]), AMPLIFIED PROBE TECHNIQUE, MAKING USE OF HIGH THROUGHPUT TECHNOLOGIES AS DESCRIBED BY CMS-2020-01-R: HCPCS

## 2021-04-07 PROCEDURE — U0005 INFEC AGEN DETEC AMPLI PROBE: HCPCS

## 2021-04-07 NOTE — H&P
Department of Plastic Surgery - Adult  Attending Consult Note      CHIEF COMPLAINT:   Nasal airway restriction     History Obtained From:  patient    HISTORY OF PRESENT ILLNESS:                The patient is a 22 y.o. female who presents for    Follow up today from up to plasty and inferior turbinectomy as well as rhinoplasty. Patient states her breathing has markedly improved but has some drainage from time to time but is improving. Patient is very satisfied with her AP and lateral view, however she does note some continued alar asymmetry. Past Medical History:    Past Medical History:   Diagnosis Date    Known health problems: none     none per pt    PONV (postoperative nausea and vomiting)     with anesthesia     Past Surgical History:    Past Surgical History:   Procedure Laterality Date    RHINOPLASTY  01/11/2021    bilateral turbinoplasty, septoplasty, rhinoplasty     RHINOPLASTY Bilateral 1/11/2021    BILATERAL INFERIOR TURBINOPLASTY, SEPTOPLASTY AND COSMETIC RHINOPLASTY (CPT 56515,29104,20146) performed by Al Zavala MD at [facility] SEPTOPLASTY Bilateral 1/11/2021    SEPTOPLASTY TURBINOPLASTY performed by Al Zavala MD at [address] [facility]  age 2    0 per pt    WISDOM TOOTH EXTRACTION      2014 apx per pt     Current Medications:   No current facility-administered medications for this encounter. Current Outpatient Medications   Medication Sig Dispense Refill    TRI FEMYNOR 0.18/0.215/0.25 MG-35 MCG TABS TAKE 1 TABLET BY MOUTH EVERY DAY 84 tablet 0       Allergies:  Patient has no known allergies.     Social History:   Social History     Socioeconomic History    Marital status: Single     Spouse name: Not on file    Number of children: Not on file    Years of education: Not on file    Highest education level: Not on file   Occupational History    Not on file   Social Needs    Financial resource strain: Not on file   [place] insecurity     Worry: Not on file     Inability: Not on file    Transportation needs     Medical: Not on file     Non-medical: Not on file   Tobacco Use    Smoking status: Never Smoker    Smokeless tobacco: Never Used   Substance and Sexual Activity    Alcohol use: No    Drug use: No    Sexual activity: Never   Lifestyle    Physical activity     Days per week: Not on file     Minutes per session: Not on file    Stress: Not on file   Relationships    Social connections     Talks on phone: Not on file     Gets together: Not on file     Attends Tenriism service: Not on file     Active member of club or organization: Not on file     Attends meetings of clubs or organizations: Not on file     Relationship status: Not on file    Intimate partner violence     Fear of current or ex partner: Not on file     Emotionally abused: Not on file     Physically abused: Not on file     Forced sexual activity: Not on file   Other Topics Concern    Not on file   Social History Narrative    Not on file     Family History:   Family History   Problem Relation Age of Onset    No Known Problems Mother     No Known Problems Father        REVIEW OF SYSTEMS:    CONSTITUTIONAL:  negative for  fevers, chills, sweats and fatigue  EYES: negative for dipolpia or acute vision loss. RESPIRATORY:  negative for  dry cough, cough with sputum, dyspnea, wheezing and chest pain  HENT:negative for pain, headache, difficulty swallowing or nose bleeds. CARDIOVASCULAR:  negative for  chest pain, dyspnea, palpitations, syncope  GASTROINTESTINAL:  negative for nausea, vomiting, change in bowel habits, diarrhea, and constipation   EXTREMITIES: negative for edema  MUSCULOSKELETAL: negative for muscle weakness  SKIN: negative for itching or rashes.   HEME: negative for easy brusing or bleeding  BEHAVIOR/PSYCH:  negative for poor appetite, increased appetite, decreased sleep and poor concentration      Pulse 80   Temp 98 °F (36.7 °C) (Temporal)

## 2021-04-08 LAB
SARS-COV-2: NOT DETECTED
SOURCE: NORMAL

## 2021-04-09 ENCOUNTER — ANESTHESIA EVENT (OUTPATIENT)
Dept: OPERATING ROOM | Age: 26
End: 2021-04-09
Payer: COMMERCIAL

## 2021-04-09 ASSESSMENT — LIFESTYLE VARIABLES: SMOKING_STATUS: 0

## 2021-04-09 NOTE — ANESTHESIA PRE PROCEDURE
Department of Anesthesiology  Preprocedure Note       Name:  Monica Porter   Age:  22 y.o.  :  1995                                          MRN:  [id number]         Date:  2021      Surgeon: Jewell Kelley):  Jim Romero MD    Procedure: Procedure(s): MINOR REVISION RIGHT NASAL ALA POSSIBLE AURICULAR CARTILAGE GRAFT    Medications prior to admission:   Prior to Admission medications    Medication Sig Start Date End Date Taking? Authorizing Provider   TRI Amol Dove 0.18/0.215/0.25 MG-35 MCG TABS TAKE 1 TABLET BY MOUTH EVERY DAY 20   Olive Kaplan MD       Current medications:    No current facility-administered medications for this encounter.       Current Outpatient Medications   Medication Sig Dispense Refill    TRI FEMYNOR 0.18/0.215/0.25 MG-35 MCG TABS TAKE 1 TABLET BY MOUTH EVERY DAY 84 tablet 0       Allergies:  No Known Allergies    Problem List:    Patient Active Problem List   Diagnosis Code    Cervical high risk HPV (human papillomavirus) test positive R87.810    LGSIL of cervix of undetermined significance R87.612    Post-op pain G89.18       Past Medical History:        Diagnosis Date    Known health problems: none     none per pt    PONV (postoperative nausea and vomiting)     with anesthesia       Past Surgical History:        Procedure Laterality Date    RHINOPLASTY  2021    bilateral turbinoplasty, septoplasty, rhinoplasty     RHINOPLASTY Bilateral 2021    BILATERAL INFERIOR TURBINOPLASTY, SEPTOPLASTY AND COSMETIC RHINOPLASTY (CPT 64909,47885,26520) performed by Jim Romero MD at [address] [facility] SEPTOPLASTY Bilateral 2021    SEPTOPLASTY TURBINOPLASTY performed by Jim Romero MD at [address] [place] [address],[address]  age 2    0 per pt    WISDOM TOOTH EXTRACTION      2014 apx per pt       Social History:    Social History     Tobacco Use    Smoking status: Never Smoker    Smokeless tobacco: Never Used   Substance Use Negative Neuro/Psych ROS              GI/Hepatic/Renal: Neg GI/Hepatic/Renal ROS            Endo/Other: Negative Endo/Other ROS                    Abdominal:         (-) obese     Vascular: negative vascular ROS. Anesthesia Plan      MAC     ASA 1       Induction: intravenous. Anesthetic plan and risks discussed with patient. Plan discussed with CRNA.     Attending anesthesiologist reviewed and agrees with Domenica Najjar, MD   4/9/2021

## 2021-04-12 ENCOUNTER — HOSPITAL ENCOUNTER (OUTPATIENT)
Age: 26
Setting detail: OUTPATIENT SURGERY
Discharge: HOME | End: 2021-04-12
Attending: PLASTIC SURGERY | Admitting: PLASTIC SURGERY
Payer: COMMERCIAL

## 2021-04-12 ENCOUNTER — ANESTHESIA (OUTPATIENT)
Dept: OPERATING ROOM | Age: 26
End: 2021-04-12
Payer: COMMERCIAL

## 2021-04-12 VITALS
HEART RATE: 82 BPM | SYSTOLIC BLOOD PRESSURE: 111 MMHG | HEIGHT: 69 IN | WEIGHT: 194.3 LBS | OXYGEN SATURATION: 98 % | TEMPERATURE: 97 F | DIASTOLIC BLOOD PRESSURE: 77 MMHG | RESPIRATION RATE: 16 BRPM | BODY MASS INDEX: 28.78 KG/M2

## 2021-04-12 VITALS
DIASTOLIC BLOOD PRESSURE: 68 MMHG | SYSTOLIC BLOOD PRESSURE: 116 MMHG | RESPIRATION RATE: 17 BRPM | OXYGEN SATURATION: 99 %

## 2021-04-12 DIAGNOSIS — G89.18 POST-OP PAIN: ICD-10-CM

## 2021-04-12 DIAGNOSIS — R68.89 NASAL AIRWAY ABNORMALITY: Primary | ICD-10-CM

## 2021-04-12 LAB
HCG, URINE, POC: NEGATIVE
Lab: NORMAL
NEGATIVE QC PASS/FAIL: NORMAL
POSITIVE QC PASS/FAIL: NORMAL

## 2021-04-12 PROCEDURE — 6370000000 HC RX 637 (ALT 250 FOR IP): Performed by: PLASTIC SURGERY

## 2021-04-12 PROCEDURE — 2580000003 HC RX 258: Performed by: ANESTHESIOLOGY

## 2021-04-12 PROCEDURE — 2709999900 HC NON-CHARGEABLE SUPPLY: Performed by: PLASTIC SURGERY

## 2021-04-12 PROCEDURE — 2720000010 HC SURG SUPPLY STERILE: Performed by: PLASTIC SURGERY

## 2021-04-12 PROCEDURE — 99999 PR OFFICE/OUTPT VISIT,PROCEDURE ONLY: CPT | Performed by: PLASTIC SURGERY

## 2021-04-12 PROCEDURE — 81025 URINE PREGNANCY TEST: CPT | Performed by: PLASTIC SURGERY

## 2021-04-12 PROCEDURE — 7100000011 HC PHASE II RECOVERY - ADDTL 15 MIN: Performed by: PLASTIC SURGERY

## 2021-04-12 PROCEDURE — 2500000003 HC RX 250 WO HCPCS: Performed by: PLASTIC SURGERY

## 2021-04-12 PROCEDURE — 3700000000 HC ANESTHESIA ATTENDED CARE: Performed by: PLASTIC SURGERY

## 2021-04-12 PROCEDURE — 3600000003 HC SURGERY LEVEL 3 BASE: Performed by: PLASTIC SURGERY

## 2021-04-12 PROCEDURE — 3600000013 HC SURGERY LEVEL 3 ADDTL 15MIN: Performed by: PLASTIC SURGERY

## 2021-04-12 PROCEDURE — 3700000001 HC ADD 15 MINUTES (ANESTHESIA): Performed by: PLASTIC SURGERY

## 2021-04-12 PROCEDURE — 7100000010 HC PHASE II RECOVERY - FIRST 15 MIN: Performed by: PLASTIC SURGERY

## 2021-04-12 PROCEDURE — 6360000002 HC RX W HCPCS: Performed by: NURSE ANESTHETIST, CERTIFIED REGISTERED

## 2021-04-12 PROCEDURE — 6360000002 HC RX W HCPCS: Performed by: PHYSICIAN ASSISTANT

## 2021-04-12 DEVICE — IMPLANTABLE DEVICE: Type: IMPLANTABLE DEVICE | Site: NOSE | Status: FUNCTIONAL

## 2021-04-12 RX ORDER — SODIUM CHLORIDE 0.9 % (FLUSH) 0.9 %
5-40 SYRINGE (ML) INJECTION PRN
Status: DISCONTINUED | OUTPATIENT
Start: 2021-04-12 | End: 2021-04-12 | Stop reason: HOSPADM

## 2021-04-12 RX ORDER — OXYCODONE HYDROCHLORIDE AND ACETAMINOPHEN 5; 325 MG/1; MG/1
1 TABLET ORAL EVERY 4 HOURS PRN
Status: DISCONTINUED | OUTPATIENT
Start: 2021-04-12 | End: 2021-04-12 | Stop reason: HOSPADM

## 2021-04-12 RX ORDER — LABETALOL HYDROCHLORIDE 5 MG/ML
5 INJECTION, SOLUTION INTRAVENOUS EVERY 10 MIN PRN
Status: DISCONTINUED | OUTPATIENT
Start: 2021-04-12 | End: 2021-04-12 | Stop reason: HOSPADM

## 2021-04-12 RX ORDER — SODIUM CHLORIDE 0.9 % (FLUSH) 0.9 %
5-40 SYRINGE (ML) INJECTION EVERY 12 HOURS SCHEDULED
Status: DISCONTINUED | OUTPATIENT
Start: 2021-04-12 | End: 2021-04-12 | Stop reason: HOSPADM

## 2021-04-12 RX ORDER — MORPHINE SULFATE 2 MG/ML
1 INJECTION, SOLUTION INTRAMUSCULAR; INTRAVENOUS EVERY 5 MIN PRN
Status: DISCONTINUED | OUTPATIENT
Start: 2021-04-12 | End: 2021-04-12 | Stop reason: HOSPADM

## 2021-04-12 RX ORDER — HYDROCODONE BITARTRATE AND ACETAMINOPHEN 5; 325 MG/1; MG/1
2 TABLET ORAL PRN
Status: DISCONTINUED | OUTPATIENT
Start: 2021-04-12 | End: 2021-04-12 | Stop reason: HOSPADM

## 2021-04-12 RX ORDER — FENTANYL CITRATE 50 UG/ML
50 INJECTION, SOLUTION INTRAMUSCULAR; INTRAVENOUS EVERY 5 MIN PRN
Status: DISCONTINUED | OUTPATIENT
Start: 2021-04-12 | End: 2021-04-12 | Stop reason: HOSPADM

## 2021-04-12 RX ORDER — FENTANYL CITRATE 50 UG/ML
INJECTION, SOLUTION INTRAMUSCULAR; INTRAVENOUS PRN
Status: DISCONTINUED | OUTPATIENT
Start: 2021-04-12 | End: 2021-04-12 | Stop reason: SDUPTHER

## 2021-04-12 RX ORDER — DEXAMETHASONE SODIUM PHOSPHATE 10 MG/ML
INJECTION, SOLUTION INTRAMUSCULAR; INTRAVENOUS PRN
Status: DISCONTINUED | OUTPATIENT
Start: 2021-04-12 | End: 2021-04-12 | Stop reason: SDUPTHER

## 2021-04-12 RX ORDER — HYDROCODONE BITARTRATE AND ACETAMINOPHEN 5; 325 MG/1; MG/1
1 TABLET ORAL PRN
Status: DISCONTINUED | OUTPATIENT
Start: 2021-04-12 | End: 2021-04-12 | Stop reason: HOSPADM

## 2021-04-12 RX ORDER — SODIUM CHLORIDE, SODIUM LACTATE, POTASSIUM CHLORIDE, CALCIUM CHLORIDE 600; 310; 30; 20 MG/100ML; MG/100ML; MG/100ML; MG/100ML
INJECTION, SOLUTION INTRAVENOUS CONTINUOUS
Status: DISCONTINUED | OUTPATIENT
Start: 2021-04-12 | End: 2021-04-12 | Stop reason: HOSPADM

## 2021-04-12 RX ORDER — ONDANSETRON 2 MG/ML
INJECTION INTRAMUSCULAR; INTRAVENOUS PRN
Status: DISCONTINUED | OUTPATIENT
Start: 2021-04-12 | End: 2021-04-12 | Stop reason: SDUPTHER

## 2021-04-12 RX ORDER — SODIUM CHLORIDE 9 MG/ML
25 INJECTION, SOLUTION INTRAVENOUS PRN
Status: DISCONTINUED | OUTPATIENT
Start: 2021-04-12 | End: 2021-04-12 | Stop reason: HOSPADM

## 2021-04-12 RX ORDER — PROPOFOL 10 MG/ML
INJECTION, EMULSION INTRAVENOUS CONTINUOUS PRN
Status: DISCONTINUED | OUTPATIENT
Start: 2021-04-12 | End: 2021-04-12 | Stop reason: SDUPTHER

## 2021-04-12 RX ORDER — CEPHALEXIN 500 MG/1
500 CAPSULE ORAL 4 TIMES DAILY
Qty: 20 CAPSULE | Refills: 0 | Status: SHIPPED | OUTPATIENT
Start: 2021-04-12 | End: 2021-04-17

## 2021-04-12 RX ORDER — MIDAZOLAM HYDROCHLORIDE 1 MG/ML
INJECTION INTRAMUSCULAR; INTRAVENOUS PRN
Status: DISCONTINUED | OUTPATIENT
Start: 2021-04-12 | End: 2021-04-12 | Stop reason: SDUPTHER

## 2021-04-12 RX ORDER — HYDROCODONE BITARTRATE AND ACETAMINOPHEN 5; 325 MG/1; MG/1
1 TABLET ORAL EVERY 6 HOURS PRN
Qty: 10 TABLET | Refills: 0 | Status: SHIPPED | OUTPATIENT
Start: 2021-04-12 | End: 2021-04-19

## 2021-04-12 RX ORDER — PROMETHAZINE HYDROCHLORIDE 25 MG/ML
25 INJECTION, SOLUTION INTRAMUSCULAR; INTRAVENOUS
Status: DISCONTINUED | OUTPATIENT
Start: 2021-04-12 | End: 2021-04-12 | Stop reason: HOSPADM

## 2021-04-12 RX ORDER — HYDRALAZINE HYDROCHLORIDE 20 MG/ML
5 INJECTION INTRAMUSCULAR; INTRAVENOUS EVERY 10 MIN PRN
Status: DISCONTINUED | OUTPATIENT
Start: 2021-04-12 | End: 2021-04-12 | Stop reason: HOSPADM

## 2021-04-12 RX ORDER — DIPHENHYDRAMINE HYDROCHLORIDE 50 MG/ML
12.5 INJECTION INTRAMUSCULAR; INTRAVENOUS
Status: DISCONTINUED | OUTPATIENT
Start: 2021-04-12 | End: 2021-04-12 | Stop reason: HOSPADM

## 2021-04-12 RX ORDER — CEFAZOLIN SODIUM 2 G/50ML
2000 SOLUTION INTRAVENOUS
Status: COMPLETED | OUTPATIENT
Start: 2021-04-12 | End: 2021-04-12

## 2021-04-12 RX ORDER — MEPERIDINE HYDROCHLORIDE 25 MG/ML
12.5 INJECTION INTRAMUSCULAR; INTRAVENOUS; SUBCUTANEOUS EVERY 5 MIN PRN
Status: DISCONTINUED | OUTPATIENT
Start: 2021-04-12 | End: 2021-04-12 | Stop reason: HOSPADM

## 2021-04-12 RX ORDER — PROPOFOL 10 MG/ML
INJECTION, EMULSION INTRAVENOUS PRN
Status: DISCONTINUED | OUTPATIENT
Start: 2021-04-12 | End: 2021-04-12 | Stop reason: SDUPTHER

## 2021-04-12 RX ORDER — SODIUM CHLORIDE 9 MG/ML
INJECTION, SOLUTION INTRAVENOUS CONTINUOUS
Status: DISCONTINUED | OUTPATIENT
Start: 2021-04-12 | End: 2021-04-12 | Stop reason: HOSPADM

## 2021-04-12 RX ADMIN — FENTANYL CITRATE 50 MCG: 50 INJECTION, SOLUTION INTRAMUSCULAR; INTRAVENOUS at 13:06

## 2021-04-12 RX ADMIN — PROPOFOL 50 MG: 10 INJECTION, EMULSION INTRAVENOUS at 12:47

## 2021-04-12 RX ADMIN — SODIUM CHLORIDE, POTASSIUM CHLORIDE, SODIUM LACTATE AND CALCIUM CHLORIDE: 600; 310; 30; 20 INJECTION, SOLUTION INTRAVENOUS at 13:30

## 2021-04-12 RX ADMIN — ONDANSETRON 4 MG: 2 INJECTION INTRAMUSCULAR; INTRAVENOUS at 12:44

## 2021-04-12 RX ADMIN — DEXAMETHASONE SODIUM PHOSPHATE 10 MG: 10 INJECTION, SOLUTION INTRAMUSCULAR; INTRAVENOUS at 12:46

## 2021-04-12 RX ADMIN — CEFAZOLIN SODIUM 2000 MG: 2 SOLUTION INTRAVENOUS at 12:42

## 2021-04-12 RX ADMIN — PROPOFOL 40 MG: 10 INJECTION, EMULSION INTRAVENOUS at 13:00

## 2021-04-12 RX ADMIN — PROPOFOL 100 MCG/KG/MIN: 10 INJECTION, EMULSION INTRAVENOUS at 12:47

## 2021-04-12 RX ADMIN — MIDAZOLAM 2 MG: 1 INJECTION INTRAMUSCULAR; INTRAVENOUS at 12:43

## 2021-04-12 RX ADMIN — FENTANYL CITRATE 50 MCG: 50 INJECTION, SOLUTION INTRAMUSCULAR; INTRAVENOUS at 12:47

## 2021-04-12 RX ADMIN — SODIUM CHLORIDE, POTASSIUM CHLORIDE, SODIUM LACTATE AND CALCIUM CHLORIDE: 600; 310; 30; 20 INJECTION, SOLUTION INTRAVENOUS at 11:52

## 2021-04-12 ASSESSMENT — PAIN SCALES - GENERAL
PAINLEVEL_OUTOF10: 0
PAINLEVEL_OUTOF10: 0

## 2021-04-12 NOTE — ANESTHESIA POSTPROCEDURE EVALUATION
Department of Anesthesiology  Postprocedure Note    Patient: Tunde Payne  MRN: [id number]  YOB: 1995  Date of evaluation: 4/12/2021  Time:  2:16 PM     Procedure Summary     Date: 04/12/21 Room / Location: [address] [place] [address] 03 / [facility]    Anesthesia Start: 1243 Anesthesia Stop: 3398    Procedure: MINOR REVISION RIGHT NASAL ALA POSSIBLE AURICULAR CARTILAGE GRAFT (Right Nose) Diagnosis: (NASAL AIRWAY RESTRICTION, RIGHT)    Surgeons: Cira Quiñones MD Responsible Provider: Gerri Faustin MD    Anesthesia Type: MAC ASA Status: 1          Anesthesia Type: MAC    Gaurang Phase I: Gaurang Score: 10    Gaurang Phase II: Gaurang Score: 10    Last vitals: Reviewed and per EMR flowsheets.        Anesthesia Post Evaluation    Patient location during evaluation: PACU  Patient participation: complete - patient participated  Level of consciousness: awake and alert  Airway patency: patent  Nausea & Vomiting: no nausea and no vomiting  Complications: no  Cardiovascular status: hemodynamically stable  Respiratory status: room air and spontaneous ventilation  Hydration status: stable

## 2021-04-13 NOTE — OP NOTE
[address]                                OPERATIVE REPORT    PATIENT NAME: Pancho Calderon               :        1995  MED REC NO:   [id number]                            ROOM:  ACCOUNT NO:   [de-identified]                           ADMIT DATE: 2021  PROVIDER:     Castro Presley MD    DATE OF PROCEDURE:  2021    PREOPERATIVE DIAGNOSES:  History of septoplasty for obstructive airway  and cosmetic rhinoplasty. POSTOPERATIVE DIAGNOSES:  History of septoplasty for obstructive airway  and cosmetic rhinoplasty. OPERATION PERFORMED:  Minor revision of alar base malposition of caudal  septum causing restrictive airway. ATTENDING SURGEON:  Castro Presley MD    ASSISTANT:  Jesus Alberto Gonzales PA-C. PA was required for the case due  to lack of adequately trained assistant, was involved in prepping and  draping the patient. ANESTHESIA:  Monitored anesthesia care. EBL:  2 mL. DRAINS:  No drains. SPECIMENS:  No specimens. COMPLICATIONS:  No complications. There are two sets of simple splint; one in the caudal septum and  footplate area and one on the right lower lateral cartilage. PREOPERATIVE INDICATIONS:  The patient is a 72-year-old female status  post septoplasty for obstructive breathing as well as cosmetic  rhinoplasty. The patient had overall uneventful course with cosmetic  rhinoplasty portion; however, on exam on the alar base, the septum did  tend toward the right side, thereby transposing the footplate to the  right side further obstructing the airflow on that side. The patient  also appeared to have a minor widening of the right nostril. The  patient elected to have this revised in order to improve her airflow. Risks, benefits, and alternatives were explained to her including but  not limited to bleeding, scarring, infection, and need for further  surgery.   She understood and elected to proceed. OPERATIVE PROCEDURE:  She was seen the day of surgery, marked, and all  questions were answered. She was taken back to the operating room,  placed in the supine position. SCDs were on and functioning at the time  of induction of anesthesia. Preoperative antibiotics were given prior  to incision. The area was prepped and draped in usual sterile fashion  with Betadine paint. The previous stair-step incision was marked, and  the area was then infiltrated with local anesthetic and allowed to work. A 15-blade was used to incise full-thickness through the previous scar  and with the aid of Baby Metzenbaum scissors, dissection was carried  cephalically to the caudal portion of the nasal tip. Rim incisions were  then opened partially. Further dissection was made toward the  footplates. These were exposed as well as the caudal septum. This was  then sandwiched together after removal of some of the intervening soft  tissues via a 5-0 nylon thereby improving the asymmetry of the  footplates and medializing the septum. This appeared to improve the  defect satisfactorily. Attention was then turned to the right rim  incision. This was dissected using iris scissors and the alar rim graft  appeared to be sandwiched creating a widening of this nostril. This was  removed in partial and appeared to improve the area. A small 1.5-mm  sliver of soft tissue was also excised to reduce the projection of the  right nostril. Closure was then performed with a 5-0 Monocryl deep at  the columella and 6-0 plain gut simple interrupted sutures. In order to  reinforce these repairs, simple splints were tailored and used at the  level of the footplate and septum as well as the right nasal ala. The  nose was also taped at the tip for further reinforcement. The patient  emerged from anesthesia without complication and taken to PACU in good  condition.         Jeramie Lino MD    D: 04/12/2021

## 2021-04-14 ENCOUNTER — TELEPHONE (OUTPATIENT)
Dept: SURGERY | Age: 26
End: 2021-04-14

## 2021-04-14 NOTE — TELEPHONE ENCOUNTER
Pt called stating that the tape on nose is tight and leaving a defined indentation on nose. She recalls being told with her last surgery that this should not be the case as it may cause permanent indentations. Should she be concerned?

## 2021-04-19 ENCOUNTER — OFFICE VISIT (OUTPATIENT)
Dept: SURGERY | Age: 26
End: 2021-04-19

## 2021-04-19 VITALS
OXYGEN SATURATION: 100 % | DIASTOLIC BLOOD PRESSURE: 70 MMHG | WEIGHT: 180 LBS | BODY MASS INDEX: 26.66 KG/M2 | SYSTOLIC BLOOD PRESSURE: 98 MMHG | HEART RATE: 76 BPM | TEMPERATURE: 97.6 F | HEIGHT: 69 IN

## 2021-04-19 DIAGNOSIS — R68.89 NASAL AIRWAY ABNORMALITY: ICD-10-CM

## 2021-04-19 DIAGNOSIS — M95.0 NASAL DEFORMITY: Primary | ICD-10-CM

## 2021-04-19 PROCEDURE — 99024 POSTOP FOLLOW-UP VISIT: CPT | Performed by: PLASTIC SURGERY

## 2021-04-19 NOTE — PROGRESS NOTES
Subjective: Follow up today from Minor revision of alar base malposition of caudal  septum causing restrictive airway. . Denies fever, nausea, vomiting, leg pain or swelling, pain is absent. They state that their pain is  controlled with OTC analgesia. The patient denies any problems with their nasal tape and have had minimal drainage from the nose. They state they have been adherent to their post op instructions and restrictions. They present today for a wound check. Objective:    /80  Temp(Src) 97 °F (36.1 °C) (Tympanic)  Ht 5' 10\" (1.778 m)  Wt 205 lb (92.987 kg)  BMI 29.41 kg/m2      Nose: Nasal -Nasal taping intact. Minimal crusting to nares, nasal tip capillary refill intact, sensation to light touch  intact to nasal tip. Simple splints in place. Architecture restored and more symmetry. Assessment:    Patient Active Problem List   Diagnosis    Nasal deformity, acquired       Plan:     Continue ABX as directed  Pain Control PRN  HOB elevated while at rest  Educated the patient on the extent of their surgery and that edema and ecchymosis are common. Nasal simple splints removed. Good restoration of symmetry. F/U in 3 weeks. Call office with concerns or signs of infection. Attestation    No change in patient's H & P. I have reviewed the procedure with the patient as well as the risk and benefits. They have no further questions and agree to proceed with surgery.     Lázaro Lopez MD   9:18 AM  4/19/2021

## 2021-05-05 ENCOUNTER — OFFICE VISIT (OUTPATIENT)
Dept: SURGERY | Age: 26
End: 2021-05-05

## 2021-05-05 VITALS — HEART RATE: 72 BPM | TEMPERATURE: 98.2 F | OXYGEN SATURATION: 94 %

## 2021-05-05 DIAGNOSIS — M95.0 NASAL DEFORMITY: Primary | ICD-10-CM

## 2021-05-05 DIAGNOSIS — Z98.890 S/P RHINOPLASTY: ICD-10-CM

## 2021-05-05 PROCEDURE — 99024 POSTOP FOLLOW-UP VISIT: CPT | Performed by: PLASTIC SURGERY

## 2021-05-05 NOTE — PROGRESS NOTES
Subjective: Follow up today from Minor revision of alar base malposition of caudal  septum causing restrictive airway. Denies fever, nausea, vomiting, leg pain or swelling, pain is absent. They state they have been adherent to their post op instructions and restrictions. They present today for a wound check. She states that she noted a few days after removal of the silicone splints that the asymmetry began to recur. She states the areas are still significantly tender to touch. Objective:    /80  Temp(Src) 97 °F (36.1 °C) (Tympanic)  Ht 5' 10\" (1.778 m)  Wt 205 lb (92.987 kg)  BMI 29.41 kg/m2      Nose: Nasal -Nasal taping intact. Minimal crusting to nares, nasal tip capillary refill intact, sensation to light touch  intact to nasal tip. Architecture restored and more symmetry. Stairstep incision is well as the areas treated which is the medial crura of the right lower lateral cartilage and the right lateral nasal ala are significantly swollen and do have some erythema indicative of inflammation. No evidence for infection. Patient does for this reason have restriction on the right nasal airway. Assessment:    Patient Active Problem List   Diagnosis    Nasal deformity, acquired       Plan:     During exam, that the patient is significantly bothered by pain with gentle manipulation of the area indicates there is significant amounts of swelling. That she initially had a great postoperative response after removal of the simple splints would lead us to want to wait prior to jumping to any need for further surgical intervention. It certainly is possible that once the inflammation resolves that the thickening of both of the areas treated would reduce thereby restoring more symmetry and improving her airway on the right side. I have offered the patient reassurance. F/U in 2 months    Call office with concerns or signs of infection.       This document is generated, in part, by voice recognition software and thus  syntax and grammatical errors are possible.     Jim Burns  2:27 PM  5/5/2021

## 2021-06-21 NOTE — PROGRESS NOTES
Department of Plastic Surgery - Adult  Attending Consult Note        CHIEF COMPLAINT: Right nasal airway collapse    History Obtained From:  patient    HISTORY OF PRESENT ILLNESS:                The patient is a 22 y.o. female who presents with concerns of right nasal airway obstruction. The pt states they have had nasal symptoms for many years. They attribute there nasal problems to defect in the nasal airway. The pt has seen ENT and was told to see plastic surgery. The pt states that they  do have breathing difficulties and have noticed difficulty breathing from right nare(s). The pt states they do  have seasonal allergies which may contribute to their symptoms. They have  had previous imaging of the nasal bones and soft tissue. They  deny previous trauma to the nose. The patient is also slightly concerned about their appearance, specifically the collapse of the right nasal ala during inspiration. The patient has tried over-the-counter medications and conservative management for greater than 6 weeks and has  failed nasal steroids in the form of Nasacort as well. She did have a cosmetic rhinoplasty in the past to improve the dorsal hump and refining the tip. She also had a septoplasty at that time. It improved the left nasal airway significantly but still has remnant problems on the right side.     Past Medical History:    Past Medical History:   Diagnosis Date    Known health problems: none     none per pt    PONV (postoperative nausea and vomiting)     with anesthesia     Past Surgical History:    Past Surgical History:   Procedure Laterality Date    OTHER SURGICAL HISTORY  04/12/2021    minor revision right nasal ala possible auricular cartilage graft    RHINOPLASTY  01/11/2021    bilateral turbinoplasty, septoplasty, rhinoplasty     RHINOPLASTY Bilateral 1/11/2021    BILATERAL INFERIOR TURBINOPLASTY, SEPTOPLASTY AND COSMETIC RHINOPLASTY (CPT 86066,96164,49187) performed by Andrew Savage MD at [address] Right 4/12/2021    MINOR REVISION RIGHT NASAL ALA POSSIBLE AURICULAR CARTILAGE GRAFT performed by Riccardo Olmstead MD at [address] [facility] SEPTOPLASTY Bilateral 1/11/2021    SEPTOPLASTY TURBINOPLASTY performed by Riccardo Olmstead MD at [address] [facility]  age 2    0 per pt    WISDOM TOOTH EXTRACTION      2014 apx per pt     Current Medications:   No current facility-administered medications for this visit. Allergies:  Patient has no known allergies. Social History:   Social History     Socioeconomic History    Marital status: Single     Spouse name: Not on file    Number of children: Not on file    Years of education: Not on file    Highest education level: Not on file   Occupational History    Not on file   Tobacco Use    Smoking status: Never Smoker    Smokeless tobacco: Never Used   Vaping Use    Vaping Use: Never used   Substance and Sexual Activity    Alcohol use: No    Drug use: No    Sexual activity: Never   Other Topics Concern    Not on file   Social History Narrative    Not on file     Social Determinants of Health     Financial Resource Strain:     Difficulty of Paying Living Expenses:    Food Insecurity:     Worried About Running Out of Food in the Last Year:     [address] in the Last Year:    Transportation Needs:     Lack of Transportation (Medical):      Lack of Transportation (Non-Medical):    Physical Activity:     Days of Exercise per Week:     Minutes of Exercise per Session:    Stress:     Feeling of Stress :    Social Connections:     Frequency of Communication with Friends and Family:     Frequency of Social Gatherings with Friends and Family:     Attends Jewish Services:     Active Member of Clubs or Organizations:     Attends Club or Organization Meetings:     Marital Status:    Intimate Partner Violence:     Fear of Current or Ex-Partner:     Emotionally Abused:     Physically Abused:     Sexually Abused:      Family History:   Family History   Problem Relation Age of Onset    No Known Problems Mother     No Known Problems Father        REVIEW OF SYSTEMS:    CONSTITUTIONAL:  negative for  fevers, chills, sweats and fatigue  EYES: negative for dipolpia or acute vision loss. RESPIRATORY:  negative for  dry cough, cough with sputum, dyspnea, wheezing and chest pain  CARDIOVASCULAR:  negative for  chest pain, dyspnea, palpitations, syncope  GASTROINTESTINAL:  negative for nausea, vomiting, change in bowel habits, diarrhea, constipation and abdominal pain  EXTREMITIES: negative for edema  MUSCULOSKELETAL: negative for muscle weakness  SKIN: negative for itching or rashes. BEHAVIOR/PSYCH:  negative for poor appetite, increased appetite, decreased sleep and poor concentration    NEURO: no Migraine headaches, sinus headaches.         PHYSICAL EXAM:    VITALS:  Pulse 83   Temp 97.3 °F (36.3 °C) (Temporal)   Ht 5' 9\" (1.753 m)   Wt 180 lb (81.6 kg)   SpO2 97%   BMI 26.58 kg/m²   CONSTITUTIONAL:  awake, alert, cooperative, no apparent distress, and appears stated age  LUNGS:  No increased work of breathing, good air exchange, clear to auscultation bilaterally, no crackles or wheezing  CARDIOVASCULAR:  Normal apical impulse, regular rate and rhythm, normal S1 and S2, no S3 or S4, and no murmur noted    EXTERNAL NASAL SPECULUM EXAM : presence of deformity in the nasal area, Nasal bridge optimal, Nasal Dorsum optimal, Nasal Tip optimal, Alae collapsed on the right side, worsened during gentle inspiration, audible whistling noted, Columella slightly deviated, Nasal Bones optimal.         Deviated septum by nasal speculum exam to the right  Turbinate hypertrophy does not appear to be present  Airway reduction on the right side  Nadia maneuver improves airflow  No septal perforations or hematomas  Patency of nasal passages compromised on the right  Degree of obstruction to the left nasal airway 85%  Degree of obstruction to the right nasal airway none      DATA:        Radiology Review: CT pending    Photos obtained today:  AP and lateral as well as worms eye view with inspiratory collapse of nasal valve. IMPRESSION/RECOMMENDATIONS:        Patient demonstrates continued right sided nasal airway compromise status post septoplasty. Patient will likely need alar batten grafting to prevent this collapse. The procedure is medically necessary due to sniffing and nasal airway compromise on the right side    Life Size Photos Taken Chaperone present        After obtaining the CT scan, I will see the patient back will have further discussion about a definitive surgical plan. This document is generated, in part, by voice recognition software and thus  syntax and grammatical errors are possible.     Naheed Griggs MD  10:50 AM  6/30/2021

## 2021-06-30 ENCOUNTER — OFFICE VISIT (OUTPATIENT)
Dept: SURGERY | Age: 26
End: 2021-06-30

## 2021-06-30 VITALS
BODY MASS INDEX: 26.66 KG/M2 | TEMPERATURE: 97.3 F | WEIGHT: 180 LBS | HEART RATE: 83 BPM | OXYGEN SATURATION: 97 % | HEIGHT: 69 IN

## 2021-06-30 DIAGNOSIS — Z98.890 S/P RHINOPLASTY: Primary | ICD-10-CM

## 2021-06-30 PROCEDURE — 99024 POSTOP FOLLOW-UP VISIT: CPT | Performed by: PLASTIC SURGERY

## 2021-07-13 DIAGNOSIS — R68.89 NASAL AIRWAY ABNORMALITY: Primary | ICD-10-CM

## 2021-07-23 ENCOUNTER — HOSPITAL ENCOUNTER (OUTPATIENT)
Dept: CT IMAGING | Age: 26
Discharge: HOME | End: 2021-07-25
Payer: COMMERCIAL

## 2021-07-23 DIAGNOSIS — R68.89 NASAL AIRWAY ABNORMALITY: ICD-10-CM

## 2021-07-23 PROCEDURE — 70486 CT MAXILLOFACIAL W/O DYE: CPT

## 2021-07-28 ENCOUNTER — OFFICE VISIT (OUTPATIENT)
Dept: SURGERY | Age: 26
End: 2021-07-28

## 2021-07-28 VITALS — TEMPERATURE: 97.2 F

## 2021-07-28 DIAGNOSIS — M95.0 NASAL DEFORMITY: Primary | ICD-10-CM

## 2021-07-28 PROCEDURE — 99024 POSTOP FOLLOW-UP VISIT: CPT | Performed by: PLASTIC SURGERY

## 2021-07-28 NOTE — PROGRESS NOTES
Subjective: Follow up today from initial presentation of right-sided restricted nasal airway following primary rhinoplasty. Denies fever, nausea, vomiting, leg pain or swelling, pain is mild to touch. The patient has recently obtained a CT facial bone scan and presents today to have these images reviewed. The patient has trialed a 2-week duration of Flonase as well with no relief of symptomatology. They voice no changes in her previous symptoms of their restricted nasal airway since there initial office visit. She maintains that she is overall very happy with the improved shape of her nose. PHYSICAL EXAM:    VITALS:  Temp 97.2 °F (36.2 °C) (Temporal)   LMP 07/01/2021   CONSTITUTIONAL:  awake, alert, cooperative, no apparent distress, and appears stated age  LUNGS:  No increased work of breathing, good air exchange, clear to auscultation bilaterally, no crackles or wheezing  CARDIOVASCULAR:  Normal apical impulse, regular rate and rhythm, normal S1 and S2, no S3 or S4, and no murmur noted        Physical Exam   Constitutional: She is oriented to person, place, and time. She appears well-developed and well-nourished. Neck: Normal range of motion. Neck supple. Cardiovascular: Normal rate, regular rhythm, normal heart sounds and intact distal pulses. Pulmonary/Chest: Effort normal and breath sounds normal.   Abdominal: Soft. Bowel sounds are normal. She exhibits no mass. No tenderness. Musculoskeletal: Normal range of motion. She exhibits no edema. Lymphadenopathy: She has no cervical adenopathy. Neurological: She is alert and oriented to person, place, and time. She has normal reflexes. Skin: Warm and dry. Neuro: Cranial nerves II-XII grossly intact.        EXTERNAL NASAL SPECULUM EXAM : presence of deformity in the nasal area, Nasal bridge optimal, Nasal Dorsum optimal, Nasal Tip slightly deviated to the left, Alae asymmetric with collapse of the right nasal ala, Columella slightly deviated to the left, Nasal Bones all palpable prominence of the radix area. Septum midline  Turbinate hypertrophy not present on speculum exam  Airway reduction on the right side  Nadia maneuver improves right airway  No visible septal perforations or hematomas  Patency of nasal passages compromised on the right  Degree of obstruction to the left nasal airway none  Degree of obstruction to the right nasal airway 80 to 90%            DATA:      Radiology Review:    EXAMINATION:   CT OF THE FACE WITHOUT CONTRAST  7/23/2021 1:19 pm       TECHNIQUE:   CT of the face was performed without the administration of intravenous   contrast. Multiplanar reformatted images are provided for review. Dose   modulation, iterative reconstruction, and/or weight based adjustment of the   mA/kV was utilized to reduce the radiation dose to as low as reasonably   achievable.       COMPARISON:   CT facial bones without and with contrast November 19, 2020.       HISTORY:   ORDERING SYSTEM PROVIDED HISTORY: Nasal airway abnormality   TECHNOLOGIST PROVIDED HISTORY:   Reason for exam:->nasal airway abnormality   What reading provider will be dictating this exam?->CRC       FINDINGS:   FACIAL BONES:  The maxilla, pterygoid plates and zygomatic arches are intact. The mandible is intact.  The mandibular condyles are normally situated.    Bilateral middle turbinate nadiya bullosa is present.  The nasal bones and   maxillary nasal processes are intact.       ORBITS:  The globes appear intact.  The extraocular muscles, optic nerve   sheath complexes and lacrimal glands appear unremarkable.  No retrobulbar   hematoma or mass is seen.  The orbital walls and rims are intact.       SINUSES/MASTOIDS:  The paranasal sinuses and mastoid air cells are well   aerated.  No acute fracture is seen.       SOFT TISSUES:  No appreciable facial soft tissue swelling is seen.           Impression   Unremarkable noncontrast CT maxillofacial examination.     IMPRESSION/RECOMMENDATIONS:      Diagnosis -right external valve collapse, likely secondary to cartilage resorption or lack of alar cartilage integrity. 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 The procedure is medically necessary due to significantright nasal airway compromise  As the patient has tried and failed conservative therapy for the restricted nasal airway including a duration of Flonase for greater than  weeks I believe the patient would benefit from the below described procedure. As the patient has had prolonged persistent obstructed nasal breathing due to loss of integrity of the lower lateral cartilages and upper lateral cartilage interface on the right    The patient's physical exam and photos clearly document collapse of the right nasal airway. Procedure: Revision rhinoplasty of nasal tip to address nasal alar collapse. Resection of right middle nadiya bullosa, minor revision of protuberant bone of the radix. The patient voices understanding they will plan to follow-up in our office after completion of prior authorization for life size photo review. The visit lasted greater than 25 minutes, with 50% of the time in counseling, education, review of the case, and coordination of care. The risks, benefits and options were discussed with the pt. The risks included but not limited to pain, bleeding, infection, heavy scarring, damage to surrounding structures, fluid collections, asymmetry, and need for further procedures. All of Her questions were answered to her satisfaction. As discussed at a previous visit, the patient would like to obtain a second opinion so she can be best prepared for the optimal correction. I have fully supported this and have counseled patient that I would recommend referral to Matt Patrick in [facility] [employer] secondary to his experience and expertise.   Patient voices understanding and would like a referral.    Follow Up after completion of prior authorization. This document is generated, in part, by voice recognition software and thus  syntax and grammatical errors are possible.     Sidney Avina MD  12:00 PM  7/29/2021

## 2021-07-29 ENCOUNTER — TELEPHONE (OUTPATIENT)
Dept: SURGERY | Age: 26
End: 2021-07-29

## 2021-07-29 NOTE — TELEPHONE ENCOUNTER
FYI: Spoke to Xavi Hall's office/[facility] practice manager asked for notes to be faxed to office, patient is to call Gloria/ramiro tomorrow 7/30/21 to schedule appointment. Patient notified of instructions and $75 consult fee. Office notes, OP notes, and Imaging reports faxed to Coty Bowers. Tila Thomas  [address], [address] [place] [address]    Ph: [phone]  Fax: [phone]    8/9/2021 office spoke to [place], not going to scheduled with  due to not taking her insurance. Patient is checking out other physicians that she might like to see. Patient will let office know where so office may send notes.

## 2021-08-13 ENCOUNTER — HOSPITAL ENCOUNTER (EMERGENCY)
Age: 26
Discharge: HOME | End: 2021-08-13
Payer: COMMERCIAL

## 2021-08-13 VITALS
WEIGHT: 180 LBS | OXYGEN SATURATION: 97 % | DIASTOLIC BLOOD PRESSURE: 75 MMHG | SYSTOLIC BLOOD PRESSURE: 124 MMHG | BODY MASS INDEX: 26.66 KG/M2 | HEIGHT: 69 IN | HEART RATE: 92 BPM | RESPIRATION RATE: 18 BRPM | TEMPERATURE: 97.3 F

## 2021-08-13 DIAGNOSIS — Z20.822 ENCOUNTER FOR LABORATORY TESTING FOR COVID-19 VIRUS: Primary | ICD-10-CM

## 2021-08-13 PROCEDURE — U0005 INFEC AGEN DETEC AMPLI PROBE: HCPCS

## 2021-08-13 PROCEDURE — U0003 INFECTIOUS AGENT DETECTION BY NUCLEIC ACID (DNA OR RNA); SEVERE ACUTE RESPIRATORY SYNDROME CORONAVIRUS 2 (SARS-COV-2) (CORONAVIRUS DISEASE [COVID-19]), AMPLIFIED PROBE TECHNIQUE, MAKING USE OF HIGH THROUGHPUT TECHNOLOGIES AS DESCRIBED BY CMS-2020-01-R: HCPCS

## 2021-08-13 PROCEDURE — 99283 EMERGENCY DEPT VISIT LOW MDM: CPT

## 2021-08-13 NOTE — ED PROVIDER NOTES
[address]  Department of Emergency Medicine   ED  Encounter Note  Admit Date/RoomTime: 2021  5:57 PM  ED Room: [email or web address]    NAME: Suha Palacios  : 1995  MRN: [id number]     Chief Complaint:  Covid Testing (covid testing for vacation )    History of Present Illness       Suha Palacios is a 22 y.o. old female who presents to the emergency department by private vehicle, for COVID testing with no known exposure with and no current symptoms. There has been no abdominal pain, chest tightness, cough, nausea, vomiting, diarrhea, dysuria, earache, fever, malaise, muscle aches, wheezing, loss of taste, or loss of smell. ROS   Pertinent positives and negatives are stated within HPI, all other systems reviewed and are negative. Past Medical History:  has a past medical history of Known health problems: none and PONV (postoperative nausea and vomiting). Surgical History:  has a past surgical history that includes [place] tooth extraction; Tonsillectomy and adenoidectomy (age 3); rhinoplasty (2021); rhinoplasty (Bilateral, 2021); Septoplasty (Bilateral, 2021); other surgical history (2021); and rhinoplasty (Right, 2021). Social History:  reports that she has never smoked. She has never used smokeless tobacco. She reports that she does not drink alcohol and does not use drugs. Family History: family history includes No Known Problems in her father and mother. Allergies: Patient has no known allergies. Physical Exam   Oxygen Saturation Interpretation: Normal.        ED Triage Vitals   BP Temp Temp Source Pulse Resp SpO2 Height Weight   21 1806 21 1757 21 1806 21 1757 21 1806 21 1757 21 1806 21 180   124/75 97.3 °F (36.3 °C) Tympanic 92 18 97 % 5' 9\" (1.753 m) 180 lb (81.6 kg)         · Constitutional:  Alert, development consistent with age.   · Ears:  External Ears: including Tylenol and or motrin pain/fever control.  Schedule f/u with PCP in 2-3 days.  Reason to return to ED, Red flag symptoms were discussed. To Obtain Test Results or View Medical Records via [employer]    1. If you already have a [employer] account, visit [email or web address], t and click  Log-in to [employer] to view records and any test results. 2.   If you have not activated a [employer] account, you can do so by going to [email or web address] and clicking Sign up for [employer]. 3.   Results of outpatient COVID-19 testing may take up to 5 days. 4.  If your COVID-19 test is positive, you will receive a phone call from a member of our medical team, and your results will be available in [address], our secure patient portal.  5.  If your COVID-19 test is negative, your results will be available on [employer], our secure patient portal.  If your employer is requiring you to show proof of your negative test result, you will be able to download and print off the test result record. Assessment   Encounter for COVID-19 testing    Plan   Discharge to home  Patient condition is good    New Medications     Discharge Medication List as of 8/13/2021  6:30 PM        Electronically signed by LASHAWN Yeh   DD: 8/19/21  **This report was transcribed using voice recognition software. Every effort was made to ensure accuracy; however, inadvertent computerized transcription errors may be present.   END OF ED PROVIDER NOTE         Horace Delgado  08/19/21 1085

## 2021-08-15 LAB
SARS-COV-2: NOT DETECTED
SOURCE: 1

## 2021-08-17 NOTE — TELEPHONE ENCOUNTER
Per Marichuy Jimenez @ [facility] office patient did not schedule in the office. This may due to  consult fee  And or procedure surgery center is not in patients insurance plan. Marichuy Jimenez was not sure of the exact reason.

## 2021-08-31 ENCOUNTER — TELEPHONE (OUTPATIENT)
Dept: SURGERY | Age: 26
End: 2021-08-31

## 2021-08-31 NOTE — TELEPHONE ENCOUNTER
Spoke with patient and let her know we have scheduled a consultation with Dr. Bishop Martinez for Tuesday, October 19, 2021 at 2:00. Provided office phone and address to patient. Pt was very appreciative of our office getting this set up for her.

## 2021-10-29 ENCOUNTER — OFFICE VISIT (OUTPATIENT)
Dept: ENT CLINIC | Age: 26
End: 2021-10-29
Payer: COMMERCIAL

## 2021-10-29 VITALS
DIASTOLIC BLOOD PRESSURE: 53 MMHG | HEART RATE: 62 BPM | HEIGHT: 69 IN | WEIGHT: 180 LBS | BODY MASS INDEX: 26.66 KG/M2 | SYSTOLIC BLOOD PRESSURE: 125 MMHG | OXYGEN SATURATION: 98 % | TEMPERATURE: 97.3 F

## 2021-10-29 DIAGNOSIS — J34.89 CONCHA BULLOSA: Primary | ICD-10-CM

## 2021-10-29 DIAGNOSIS — J34.89 NASAL SEPTAL PERFORATION: ICD-10-CM

## 2021-10-29 DIAGNOSIS — J34.89 NASAL VALVE COLLAPSE: ICD-10-CM

## 2021-10-29 PROCEDURE — G8484 FLU IMMUNIZE NO ADMIN: HCPCS | Performed by: OTOLARYNGOLOGY

## 2021-10-29 PROCEDURE — G8427 DOCREV CUR MEDS BY ELIG CLIN: HCPCS | Performed by: OTOLARYNGOLOGY

## 2021-10-29 PROCEDURE — 1036F TOBACCO NON-USER: CPT | Performed by: OTOLARYNGOLOGY

## 2021-10-29 PROCEDURE — G8419 CALC BMI OUT NRM PARAM NOF/U: HCPCS | Performed by: OTOLARYNGOLOGY

## 2021-10-29 PROCEDURE — 99204 OFFICE O/P NEW MOD 45 MIN: CPT | Performed by: OTOLARYNGOLOGY

## 2021-10-29 RX ORDER — DEXTROAMPHETAMINE SACCHARATE, AMPHETAMINE ASPARTATE, DEXTROAMPHETAMINE SULFATE AND AMPHETAMINE SULFATE 2.5; 2.5; 2.5; 2.5 MG/1; MG/1; MG/1; MG/1
10 TABLET ORAL 2 TIMES DAILY
COMMUNITY
Start: 2021-10-04

## 2021-10-29 ASSESSMENT — ENCOUNTER SYMPTOMS
SINUS PRESSURE: 0
VOICE CHANGE: 0
RHINORRHEA: 0
STRIDOR: 0
COLOR CHANGE: 0
COUGH: 0
GASTROINTESTINAL NEGATIVE: 1
TROUBLE SWALLOWING: 0
CHOKING: 0
SINUS PAIN: 0
SORE THROAT: 0
WHEEZING: 0

## 2021-10-29 ASSESSMENT — VISUAL ACUITY: OU: 1

## 2021-10-29 NOTE — PATIENT INSTRUCTIONS
[facility] you for choosing [facility]  or [facility]. We are committed to your medical treatment and  care. If you need to reschedule or cancel your surgery or follow up  appointment, please call the surgery scheduler at [phone]. INSTRUCTIONS FOR SURGERY    Nothing to eat or drink after midnight the night before surgery unless surgery is at [facility] or otherwise instructed by the hospital.    DO NOT TAKE ANY ASPIRIN PRODUCTS 7 days prior to surgery-unless required by your cardiologist or primary care physician. Tylenol only. No Advil, Motrin, Aleve, or Ibuprofen    Any illegal drugs in your system (including Marijuana even if legally prescribed) will result in your surgery being cancelled. Please be sure to check with our office or the hospital on time frame for the drugs to be out of your system. Should your insurance change at any time you must contact our office. Failure to do so may result in your surgery being rescheduled. If you need paperwork filled out for work, you must give the office 2 weeks to complete and submit the forms.       [address], [facility] Zia Nesbitt [address] [place] [address] will call you a couple days prior to your surgery and give you further instructions, if any questions call them at [phone]

## 2021-10-29 NOTE — PROGRESS NOTES
Subjective:      Patient ID:  Ivania Mccain is a 22 y.o. female. HPI:    Patient complains of left sided nasal obstruction. . H/o partial rhinoplasty with Dr. Alexander Smith with revision (January and April). Since then has had persistent nasal obstruction. Has tried singulair and allegra with no resolution. Was using flonase and switched to nasocort daily. No significant sinus drainage but sinus pressure and ear pressure. Biggest complaint is right sided nasal obstruction. Has been allergy tested, has not tried immunotherapy. The patient has never had nasal polyps. The patient has no history of asthma. The patient has no history of eczema. Sinus lavage: no     Headaches/Facial Pain: yes   Where: bilateral-  frontal    Perceived Nasal obstruction: yes   Side: right        Patient's medications, allergies, past medical, surgical, social and family histories were reviewed and updated as appropriate. Review of Systems   Constitutional: Negative for activity change, fatigue and fever. HENT: Positive for congestion. Negative for ear discharge, ear pain, hearing loss, nosebleeds, postnasal drip, rhinorrhea, sinus pressure, sinus pain, sore throat, tinnitus, trouble swallowing and voice change. Persistent right sided nasal obstruction   Respiratory: Negative for cough, choking, wheezing and stridor. Cardiovascular: Negative for chest pain. Gastrointestinal: Negative. Genitourinary: Negative. Skin: Negative for color change and rash. Neurological: Negative for speech difficulty, light-headedness, numbness and headaches. Hematological: Negative for adenopathy. Psychiatric/Behavioral: Negative for behavioral problems. Objective:   Physical Exam  Constitutional:       Appearance: Normal appearance. She is normal weight. HENT:      Head: Normocephalic and atraumatic. Right Ear: Tympanic membrane, ear canal and external ear normal. No drainage.       Left Ear: Tympanic membrane, ear canal and external ear normal. No drainage. Nose: Nose normal. No nasal deformity or septal deviation. Comments: Nasal valve collapse on right     Mouth/Throat:      Mouth: Mucous membranes are moist.   Eyes:      General: Lids are normal. Vision grossly intact. Extraocular Movements: Extraocular movements intact. Conjunctiva/sclera: Conjunctivae normal.      Pupils: Pupils are equal, round, and reactive to light. Cardiovascular:      Rate and Rhythm: Normal rate. Pulmonary:      Effort: Pulmonary effort is normal.   Musculoskeletal:         General: Normal range of motion. Cervical back: Normal range of motion. Lymphadenopathy:      Cervical: No cervical adenopathy. Skin:     Capillary Refill: Capillary refill takes less than 2 seconds. Neurological:      Mental Status: She is alert. Psychiatric:         Mood and Affect: Mood normal.       [place] maneuver on right sided relieves obstruction          Assessment:       Diagnosis Orders   1. Nadiya bullosa     2. Nasal valve collapse     3. Nasal septal perforation                Plan:      CT sinus reviewed  Nadiya bullosa, worse on right  Nasal valve collapse on right  Large inferior turbinates  Small anterior septal perforation     Continue flonase/nasocort daily    Patient may desire FESS, nadiya bullosa reduction, septal perforation repair using mucosal advancement flap vs post auricular cartilage graft, inferior turbinate reduction    Follow up in 6 month(s)                           Aidan Rees  1995    I have discussed the case, including pertinent history and exam findings with the resident. I have seen and examined the patient and the key elements of the encounter have been performed by me. I agree with the assessment, plan and orders as documented by the  resident              Remainder of medical problems as per  resident note. Patient seen and examined.  Agree with above exam, assessment and plan.       Electronically signed by Claudia Richard DO on 11/2/21 at 2:35 PM EDT

## 2021-12-09 NOTE — PROGRESS NOTES
Have you been tested for COVID  No vaccinated         Have you been told you were positive for COVID No  Have you had any known exposure to someone that is positive for COVID No  Do you have a cough                   No              Do you have shortness of breath No                 Do you have a sore throat            No                Are you having chills                    No                Are you having muscle aches. No                    Please come to the hospital wearing a mask and have your significant other wear a mask as well. Both of you should check your temperature before leaving to come here,  if it is 100 or higher please call [phone] for instruction.

## 2021-12-09 NOTE — PROGRESS NOTES
[facility] PRE-ADMISSION TESTING INSTRUCTIONS    The Preadmission Testing patient is instructed accordingly using the following criteria (check applicable):    ARRIVAL INSTRUCTIONS:  [x] Parking the day of Surgery is located in the Main Entrance lot. Upon entering the door, make an immediate right to the surgery reception desk    [x] Bring photo ID and insurance card    [] Bring in a copy of Living will or Durable Power of  papers. [x] Please be sure to arrange for responsible adult to provide transportation to and from the hospital    [x] Please arrange for responsible adult to be with you for the 24 hour period post procedure due to having anesthesia      GENERAL INSTRUCTIONS:    [x] Nothing by mouth after midnight, including gum, candy, mints or water    [x] You may brush your teeth, but do not swallow any water    [] Take medications as instructed with 1-2 oz of water    [x] Stop herbal supplements and vitamins 5 days prior to procedure    [] Follow preop dosing of blood thinners per physician instructions    [] Take 1/2 dose of evening insulin, but no insulin after midnight    [] No oral diabetic medications after midnight    [] If diabetic and have low blood sugar or feel symptomatic, take 1-2oz apple juice only    [] Bring inhalers day of surgery    [] Bring C-PAP/ Bi-Pap day of surgery    [x] Bring urine specimen day of surgery    [x] Shower or bath with soap, lather and rinse well, AM of Surgery, no lotion, powders or creams to surgical site    [] Follow bowel prep as instructed per surgeon    [x] No tobacco products within 24 hours of surgery     [x] No alcohol or illegal drug use within 24 hours of surgery.     [x] Jewelry, body piercing's, eyeglasses, contact lenses and dentures are not permitted into surgery (bring cases)      [x] Please do not wear any nail polish, make up or hair products on the day of surgery    [x] You can expect a call the business day prior to procedure to notify you if your arrival time changes    [x] If you receive a survey after surgery we would greatly appreciate your comments    [] Parent/guardian of a minor must accompany their child and remain on the premises  the entire time they are under our care     [] Pediatric patients may bring favorite toy, blanket or comfort item with them    [] A caregiver or family member must remain with the patient during their stay if they are mentally handicapped, have dementia, disoriented or unable to use a call light or would be a safety concern if left unattended    [x] Please notify surgeon if you develop any illness between now and time of surgery (cold, cough, sore throat, fever, nausea, vomiting) or any signs of infections  including skin, wounds, and dental.    [x]  The Outpatient Pharmacy is available to fill your prescription here on your day of surgery, ask your preop nurse for details    [] Other instructions    EDUCATIONAL MATERIALS PROVIDED:    [] PAT Preoperative Education Packet/Booklet     [] Medication List    [] Transfusion bracelet applied with instructions    [] Shower with soap, lather and rinse well, and use CHG wipes provided the evening before surgery as instructed    [] Incentive spirometer with instructions

## 2021-12-13 ENCOUNTER — ANESTHESIA (OUTPATIENT)
Dept: OPERATING ROOM | Age: 26
End: 2021-12-13
Payer: COMMERCIAL

## 2021-12-13 ENCOUNTER — HOSPITAL ENCOUNTER (OUTPATIENT)
Age: 26
Setting detail: OUTPATIENT SURGERY
Discharge: HOME | End: 2021-12-13
Attending: OTOLARYNGOLOGY | Admitting: OTOLARYNGOLOGY
Payer: COMMERCIAL

## 2021-12-13 ENCOUNTER — ANESTHESIA EVENT (OUTPATIENT)
Dept: OPERATING ROOM | Age: 26
End: 2021-12-13
Payer: COMMERCIAL

## 2021-12-13 VITALS
RESPIRATION RATE: 18 BRPM | BODY MASS INDEX: 28.14 KG/M2 | OXYGEN SATURATION: 97 % | WEIGHT: 190 LBS | TEMPERATURE: 97.8 F | HEIGHT: 69 IN | DIASTOLIC BLOOD PRESSURE: 67 MMHG | HEART RATE: 78 BPM | SYSTOLIC BLOOD PRESSURE: 142 MMHG

## 2021-12-13 VITALS
RESPIRATION RATE: 12 BRPM | DIASTOLIC BLOOD PRESSURE: 102 MMHG | SYSTOLIC BLOOD PRESSURE: 130 MMHG | OXYGEN SATURATION: 96 %

## 2021-12-13 DIAGNOSIS — G89.18 POST-OP PAIN: Primary | ICD-10-CM

## 2021-12-13 LAB
HCG, URINE, POC: NEGATIVE
Lab: NORMAL
NEGATIVE QC PASS/FAIL: NORMAL
POSITIVE QC PASS/FAIL: NORMAL

## 2021-12-13 PROCEDURE — 31240 NSL/SNS NDSC CNCH BULL RESCJ: CPT | Performed by: OTOLARYNGOLOGY

## 2021-12-13 PROCEDURE — 6370000000 HC RX 637 (ALT 250 FOR IP): Performed by: ANESTHESIOLOGY

## 2021-12-13 PROCEDURE — 30630 REPAIR NASAL SEPTUM DEFECT: CPT | Performed by: OTOLARYNGOLOGY

## 2021-12-13 PROCEDURE — 3700000001 HC ADD 15 MINUTES (ANESTHESIA): Performed by: OTOLARYNGOLOGY

## 2021-12-13 PROCEDURE — 2709999900 HC NON-CHARGEABLE SUPPLY: Performed by: OTOLARYNGOLOGY

## 2021-12-13 PROCEDURE — 6370000000 HC RX 637 (ALT 250 FOR IP): Performed by: OTOLARYNGOLOGY

## 2021-12-13 PROCEDURE — 6370000000 HC RX 637 (ALT 250 FOR IP)

## 2021-12-13 PROCEDURE — 21235 EAR CARTILAGE GRAFT: CPT | Performed by: OTOLARYNGOLOGY

## 2021-12-13 PROCEDURE — C1726 CATH, BAL DIL, NON-VASCULAR: HCPCS | Performed by: OTOLARYNGOLOGY

## 2021-12-13 PROCEDURE — 7100000010 HC PHASE II RECOVERY - FIRST 15 MIN: Performed by: OTOLARYNGOLOGY

## 2021-12-13 PROCEDURE — 3600000003 HC SURGERY LEVEL 3 BASE: Performed by: OTOLARYNGOLOGY

## 2021-12-13 PROCEDURE — 3600000013 HC SURGERY LEVEL 3 ADDTL 15MIN: Performed by: OTOLARYNGOLOGY

## 2021-12-13 PROCEDURE — 2500000003 HC RX 250 WO HCPCS: Performed by: NURSE ANESTHETIST, CERTIFIED REGISTERED

## 2021-12-13 PROCEDURE — 6360000002 HC RX W HCPCS: Performed by: ANESTHESIOLOGY

## 2021-12-13 PROCEDURE — C1894 INTRO/SHEATH, NON-LASER: HCPCS | Performed by: OTOLARYNGOLOGY

## 2021-12-13 PROCEDURE — 2720000010 HC SURG SUPPLY STERILE: Performed by: OTOLARYNGOLOGY

## 2021-12-13 PROCEDURE — 3700000000 HC ANESTHESIA ATTENDED CARE: Performed by: OTOLARYNGOLOGY

## 2021-12-13 PROCEDURE — 2580000003 HC RX 258: Performed by: NURSE ANESTHETIST, CERTIFIED REGISTERED

## 2021-12-13 PROCEDURE — 7100000011 HC PHASE II RECOVERY - ADDTL 15 MIN: Performed by: OTOLARYNGOLOGY

## 2021-12-13 PROCEDURE — 6360000002 HC RX W HCPCS: Performed by: NURSE ANESTHETIST, CERTIFIED REGISTERED

## 2021-12-13 PROCEDURE — 7100000000 HC PACU RECOVERY - FIRST 15 MIN: Performed by: OTOLARYNGOLOGY

## 2021-12-13 PROCEDURE — 30140 RESECT INFERIOR TURBINATE: CPT | Performed by: OTOLARYNGOLOGY

## 2021-12-13 PROCEDURE — 30465 REPAIR NASAL STENOSIS: CPT | Performed by: OTOLARYNGOLOGY

## 2021-12-13 PROCEDURE — 7100000001 HC PACU RECOVERY - ADDTL 15 MIN: Performed by: OTOLARYNGOLOGY

## 2021-12-13 PROCEDURE — 2500000003 HC RX 250 WO HCPCS: Performed by: OTOLARYNGOLOGY

## 2021-12-13 RX ORDER — SCOLOPAMINE TRANSDERMAL SYSTEM 1 MG/1
1 PATCH, EXTENDED RELEASE TRANSDERMAL ONCE
Status: DISCONTINUED | OUTPATIENT
Start: 2021-12-13 | End: 2021-12-13 | Stop reason: HOSPADM

## 2021-12-13 RX ORDER — HYDROCODONE BITARTRATE AND ACETAMINOPHEN 7.5; 325 MG/1; MG/1
1 TABLET ORAL EVERY 6 HOURS PRN
Qty: 20 TABLET | Refills: 0 | Status: SHIPPED | OUTPATIENT
Start: 2021-12-13 | End: 2021-12-18

## 2021-12-13 RX ORDER — HYDROCODONE BITARTRATE AND ACETAMINOPHEN 5; 325 MG/1; MG/1
1 TABLET ORAL ONCE
Status: COMPLETED | OUTPATIENT
Start: 2021-12-13 | End: 2021-12-13

## 2021-12-13 RX ORDER — LIDOCAINE HYDROCHLORIDE AND EPINEPHRINE 10; 10 MG/ML; UG/ML
INJECTION, SOLUTION INFILTRATION; PERINEURAL PRN
Status: DISCONTINUED | OUTPATIENT
Start: 2021-12-13 | End: 2021-12-13 | Stop reason: ALTCHOICE

## 2021-12-13 RX ORDER — ONDANSETRON 2 MG/ML
INJECTION INTRAMUSCULAR; INTRAVENOUS PRN
Status: DISCONTINUED | OUTPATIENT
Start: 2021-12-13 | End: 2021-12-13 | Stop reason: SDUPTHER

## 2021-12-13 RX ORDER — ONDANSETRON 2 MG/ML
4 INJECTION INTRAMUSCULAR; INTRAVENOUS
Status: DISCONTINUED | OUTPATIENT
Start: 2021-12-13 | End: 2021-12-13 | Stop reason: HOSPADM

## 2021-12-13 RX ORDER — SODIUM CHLORIDE 9 MG/ML
25 INJECTION, SOLUTION INTRAVENOUS PRN
Status: DISCONTINUED | OUTPATIENT
Start: 2021-12-13 | End: 2021-12-13 | Stop reason: HOSPADM

## 2021-12-13 RX ORDER — OXYMETAZOLINE HYDROCHLORIDE 0.05 G/100ML
2 SPRAY NASAL
Status: DISCONTINUED | OUTPATIENT
Start: 2021-12-13 | End: 2021-12-13 | Stop reason: HOSPADM

## 2021-12-13 RX ORDER — MEPERIDINE HYDROCHLORIDE 25 MG/ML
12.5 INJECTION INTRAMUSCULAR; INTRAVENOUS; SUBCUTANEOUS EVERY 5 MIN PRN
Status: DISCONTINUED | OUTPATIENT
Start: 2021-12-13 | End: 2021-12-13 | Stop reason: HOSPADM

## 2021-12-13 RX ORDER — DIAPER,BRIEF,INFANT-TODD,DISP
EACH MISCELLANEOUS PRN
Status: DISCONTINUED | OUTPATIENT
Start: 2021-12-13 | End: 2021-12-13 | Stop reason: ALTCHOICE

## 2021-12-13 RX ORDER — FENTANYL CITRATE 50 UG/ML
50 INJECTION, SOLUTION INTRAMUSCULAR; INTRAVENOUS EVERY 5 MIN PRN
Status: DISCONTINUED | OUTPATIENT
Start: 2021-12-13 | End: 2021-12-13 | Stop reason: HOSPADM

## 2021-12-13 RX ORDER — PROPOFOL 10 MG/ML
INJECTION, EMULSION INTRAVENOUS PRN
Status: DISCONTINUED | OUTPATIENT
Start: 2021-12-13 | End: 2021-12-13 | Stop reason: SDUPTHER

## 2021-12-13 RX ORDER — LIDOCAINE HYDROCHLORIDE 20 MG/ML
INJECTION, SOLUTION EPIDURAL; INFILTRATION; INTRACAUDAL; PERINEURAL PRN
Status: DISCONTINUED | OUTPATIENT
Start: 2021-12-13 | End: 2021-12-13 | Stop reason: SDUPTHER

## 2021-12-13 RX ORDER — PROMETHAZINE HYDROCHLORIDE 25 MG/ML
INJECTION, SOLUTION INTRAMUSCULAR; INTRAVENOUS PRN
Status: DISCONTINUED | OUTPATIENT
Start: 2021-12-13 | End: 2021-12-13 | Stop reason: SDUPTHER

## 2021-12-13 RX ORDER — SODIUM CHLORIDE 9 MG/ML
INJECTION, SOLUTION INTRAVENOUS CONTINUOUS PRN
Status: DISCONTINUED | OUTPATIENT
Start: 2021-12-13 | End: 2021-12-13 | Stop reason: SDUPTHER

## 2021-12-13 RX ORDER — SODIUM CHLORIDE 0.9 % (FLUSH) 0.9 %
5-40 SYRINGE (ML) INJECTION EVERY 12 HOURS SCHEDULED
Status: DISCONTINUED | OUTPATIENT
Start: 2021-12-13 | End: 2021-12-13 | Stop reason: HOSPADM

## 2021-12-13 RX ORDER — DEXAMETHASONE SODIUM PHOSPHATE 4 MG/ML
INJECTION, SOLUTION INTRA-ARTICULAR; INTRALESIONAL; INTRAMUSCULAR; INTRAVENOUS; SOFT TISSUE PRN
Status: DISCONTINUED | OUTPATIENT
Start: 2021-12-13 | End: 2021-12-13 | Stop reason: SDUPTHER

## 2021-12-13 RX ORDER — MIDAZOLAM HYDROCHLORIDE 1 MG/ML
INJECTION INTRAMUSCULAR; INTRAVENOUS PRN
Status: DISCONTINUED | OUTPATIENT
Start: 2021-12-13 | End: 2021-12-13 | Stop reason: SDUPTHER

## 2021-12-13 RX ORDER — ROCURONIUM BROMIDE 10 MG/ML
INJECTION, SOLUTION INTRAVENOUS PRN
Status: DISCONTINUED | OUTPATIENT
Start: 2021-12-13 | End: 2021-12-13 | Stop reason: SDUPTHER

## 2021-12-13 RX ORDER — ONDANSETRON 4 MG/1
4 TABLET, ORALLY DISINTEGRATING ORAL EVERY 8 HOURS PRN
Qty: 12 TABLET | Refills: 0 | Status: SHIPPED | OUTPATIENT
Start: 2021-12-13 | End: 2021-12-18

## 2021-12-13 RX ORDER — SODIUM CHLORIDE 0.9 % (FLUSH) 0.9 %
5-40 SYRINGE (ML) INJECTION PRN
Status: DISCONTINUED | OUTPATIENT
Start: 2021-12-13 | End: 2021-12-13 | Stop reason: HOSPADM

## 2021-12-13 RX ORDER — HYDROCODONE BITARTRATE AND ACETAMINOPHEN 5; 325 MG/1; MG/1
TABLET ORAL
Status: COMPLETED
Start: 2021-12-13 | End: 2021-12-13

## 2021-12-13 RX ORDER — FENTANYL CITRATE 50 UG/ML
INJECTION, SOLUTION INTRAMUSCULAR; INTRAVENOUS PRN
Status: DISCONTINUED | OUTPATIENT
Start: 2021-12-13 | End: 2021-12-13 | Stop reason: SDUPTHER

## 2021-12-13 RX ORDER — FENTANYL CITRATE 50 UG/ML
25 INJECTION, SOLUTION INTRAMUSCULAR; INTRAVENOUS EVERY 5 MIN PRN
Status: DISCONTINUED | OUTPATIENT
Start: 2021-12-13 | End: 2021-12-13 | Stop reason: HOSPADM

## 2021-12-13 RX ORDER — OXYMETAZOLINE HYDROCHLORIDE 0.05 G/100ML
SPRAY NASAL PRN
Status: DISCONTINUED | OUTPATIENT
Start: 2021-12-13 | End: 2021-12-13 | Stop reason: ALTCHOICE

## 2021-12-13 RX ADMIN — PHENYLEPHRINE HYDROCHLORIDE 2 SPRAY: 0.25 SPRAY NASAL at 12:20

## 2021-12-13 RX ADMIN — LIDOCAINE HYDROCHLORIDE 100 MG: 20 INJECTION, SOLUTION EPIDURAL; INFILTRATION; INTRACAUDAL; PERINEURAL at 13:21

## 2021-12-13 RX ADMIN — SODIUM CHLORIDE: 9 INJECTION, SOLUTION INTRAVENOUS at 14:00

## 2021-12-13 RX ADMIN — DEXAMETHASONE SODIUM PHOSPHATE 10 MG: 4 INJECTION, SOLUTION INTRAMUSCULAR; INTRAVENOUS at 13:21

## 2021-12-13 RX ADMIN — ROCURONIUM BROMIDE 50 MG: 10 INJECTION, SOLUTION INTRAVENOUS at 13:21

## 2021-12-13 RX ADMIN — PROPOFOL 200 MG: 10 INJECTION, EMULSION INTRAVENOUS at 13:21

## 2021-12-13 RX ADMIN — ROCURONIUM BROMIDE 10 MG: 10 INJECTION, SOLUTION INTRAVENOUS at 14:12

## 2021-12-13 RX ADMIN — FENTANYL CITRATE 100 MCG: 50 INJECTION, SOLUTION INTRAMUSCULAR; INTRAVENOUS at 13:21

## 2021-12-13 RX ADMIN — HYDROCODONE BITARTRATE AND ACETAMINOPHEN 1 TABLET: 5; 325 TABLET ORAL at 16:21

## 2021-12-13 RX ADMIN — ONDANSETRON 4 MG: 2 INJECTION INTRAMUSCULAR; INTRAVENOUS at 13:21

## 2021-12-13 RX ADMIN — MIDAZOLAM 2 MG: 1 INJECTION INTRAMUSCULAR; INTRAVENOUS at 13:18

## 2021-12-13 RX ADMIN — HYDROMORPHONE HYDROCHLORIDE 0.5 MG: 1 INJECTION, SOLUTION INTRAMUSCULAR; INTRAVENOUS; SUBCUTANEOUS at 15:33

## 2021-12-13 RX ADMIN — SODIUM CHLORIDE: 9 INJECTION, SOLUTION INTRAVENOUS at 13:14

## 2021-12-13 RX ADMIN — PROMETHAZINE HYDROCHLORIDE 6.25 MG: 25 INJECTION INTRAMUSCULAR; INTRAVENOUS at 14:53

## 2021-12-13 ASSESSMENT — PULMONARY FUNCTION TESTS
PIF_VALUE: 2
PIF_VALUE: 22
PIF_VALUE: 23
PIF_VALUE: 17
PIF_VALUE: 1
PIF_VALUE: 22
PIF_VALUE: 23
PIF_VALUE: 22
PIF_VALUE: 23
PIF_VALUE: 23
PIF_VALUE: 22
PIF_VALUE: 23
PIF_VALUE: 22
PIF_VALUE: 23
PIF_VALUE: 16
PIF_VALUE: 24
PIF_VALUE: 22
PIF_VALUE: 23
PIF_VALUE: 22
PIF_VALUE: 14
PIF_VALUE: 22
PIF_VALUE: 23
PIF_VALUE: 21
PIF_VALUE: 15
PIF_VALUE: 23
PIF_VALUE: 4
PIF_VALUE: 22
PIF_VALUE: 22
PIF_VALUE: 23
PIF_VALUE: 17
PIF_VALUE: 1
PIF_VALUE: 22
PIF_VALUE: 23
PIF_VALUE: 21
PIF_VALUE: 23
PIF_VALUE: 22
PIF_VALUE: 23
PIF_VALUE: 23
PIF_VALUE: 20
PIF_VALUE: 22
PIF_VALUE: 23
PIF_VALUE: 22
PIF_VALUE: 22
PIF_VALUE: 23
PIF_VALUE: 16
PIF_VALUE: 22
PIF_VALUE: 23
PIF_VALUE: 22
PIF_VALUE: 23
PIF_VALUE: 22
PIF_VALUE: 21
PIF_VALUE: 22
PIF_VALUE: 22
PIF_VALUE: 21
PIF_VALUE: 23
PIF_VALUE: 23
PIF_VALUE: 2
PIF_VALUE: 22
PIF_VALUE: 23
PIF_VALUE: 22
PIF_VALUE: 23
PIF_VALUE: 17
PIF_VALUE: 23
PIF_VALUE: 22
PIF_VALUE: 23
PIF_VALUE: 23
PIF_VALUE: 22
PIF_VALUE: 23
PIF_VALUE: 22
PIF_VALUE: 12
PIF_VALUE: 23
PIF_VALUE: 23
PIF_VALUE: 15
PIF_VALUE: 2
PIF_VALUE: 15
PIF_VALUE: 22
PIF_VALUE: 22
PIF_VALUE: 23
PIF_VALUE: 15
PIF_VALUE: 17
PIF_VALUE: 22
PIF_VALUE: 23
PIF_VALUE: 0
PIF_VALUE: 22
PIF_VALUE: 22
PIF_VALUE: 23
PIF_VALUE: 0
PIF_VALUE: 23
PIF_VALUE: 22
PIF_VALUE: 23

## 2021-12-13 ASSESSMENT — PAIN SCALES - GENERAL
PAINLEVEL_OUTOF10: 0
PAINLEVEL_OUTOF10: 5
PAINLEVEL_OUTOF10: 8
PAINLEVEL_OUTOF10: 5
PAINLEVEL_OUTOF10: 6
PAINLEVEL_OUTOF10: 8
PAINLEVEL_OUTOF10: 7

## 2021-12-13 ASSESSMENT — PAIN DESCRIPTION - LOCATION: LOCATION: FACE

## 2021-12-13 ASSESSMENT — PAIN DESCRIPTION - PAIN TYPE
TYPE: SURGICAL PAIN

## 2021-12-13 ASSESSMENT — PAIN DESCRIPTION - DESCRIPTORS
DESCRIPTORS: PRESSURE
DESCRIPTORS: ACHING

## 2021-12-13 ASSESSMENT — PAIN DESCRIPTION - FREQUENCY: FREQUENCY: CONTINUOUS

## 2021-12-13 ASSESSMENT — PAIN - FUNCTIONAL ASSESSMENT: PAIN_FUNCTIONAL_ASSESSMENT: 0-10

## 2021-12-13 NOTE — ANESTHESIA PRE PROCEDURE
Department of Anesthesiology  Preprocedure Note       Name:  John Vanessa   Age:  32 y.o.  :  1995                                          MRN:  [id number]         Date:  2021      Surgeon: Dodie Rai):  Coleman Bañuelos DO    Procedure: Procedure(s):  RIGHT NASAL VALVE COLLAPSE SUBMUCOUS RESECTION OF INFERIOR TURBINOPLASTY  REDUCTION JUSTINA BULLOSA REPAIR NASAL SEPTAL PERFORATION  EUSTACHIAN TUBE DILATION    Medications prior to admission:   Prior to Admission medications    Medication Sig Start Date End Date Taking? Authorizing Provider   vitamin D (CHOLECALCIFEROL) 25 MCG (1000 UT) TABS tablet Take 1,000 Units by mouth daily   Yes Historical Provider, MD   amphetamine-dextroamphetamine (ADDERALL) 10 MG tablet Take 10 mg by mouth 2 times daily.   10/4/21  Yes Historical Provider, MD   Norgestim-Eth Estrad Triphasic ([facility]) 0.18/0.215/0.25 MG-35 MCG TABS Take 1 tablet by mouth daily 10/4/21  Yes Bunny Liriano MD       Current medications:    Current Facility-Administered Medications   Medication Dose Route Frequency Provider Last Rate Last Admin    sodium chloride flush 0.9 % injection 5-40 mL  5-40 mL IntraVENous 2 times per day Miller Hick Montgomery, DO        sodium chloride flush 0.9 % injection 5-40 mL  5-40 mL IntraVENous PRN Miller Hick Montgomery, DO        0.9 % sodium chloride infusion  25 mL IntraVENous PRN Miller Hick Montgomery, DO        oxymetazoline (AFRIN) 0.05 % nasal spray 2 spray  2 spray Each Nostril On Call to Soha 53, DO        phenylephrine (ANTONIO-SYNEPHRINE) 0.25 % nasal spray 2 spray  2 spray Each Nostril Once Coleman Bañuelos DO           Allergies:  No Known Allergies    Problem List:    Patient Active Problem List   Diagnosis Code    Cervical high risk HPV (human papillomavirus) test positive R87.810    LGSIL of cervix of undetermined significance R87.612    Post-op pain G89.18    Nasal airway abnormality R68.89       Past Medical History: Diagnosis Date    Known health problems: none     none per pt    PONV (postoperative nausea and vomiting)     with anesthesia       Past Surgical History:        Procedure Laterality Date    OTHER SURGICAL HISTORY  04/12/2021    minor revision right nasal ala possible auricular cartilage graft    RHINOPLASTY  01/11/2021    bilateral turbinoplasty, septoplasty, rhinoplasty     RHINOPLASTY Bilateral 1/11/2021    BILATERAL INFERIOR TURBINOPLASTY, SEPTOPLASTY AND COSMETIC RHINOPLASTY (CPT 47488,63386,27641) performed by Haile Blanca MD at [address] Right 4/12/2021    MINOR REVISION RIGHT NASAL ALA POSSIBLE AURICULAR CARTILAGE GRAFT performed by Haile Blanca MD at [address] [facility] SEPTOPLASTY Bilateral 1/11/2021    SEPTOPLASTY TURBINOPLASTY performed by Haile Blanca MD at [address] [facility]  age 2    0 per pt    WISDOM TOOTH EXTRACTION      2014 apx per pt       Social History:    Social History     Tobacco Use    Smoking status: Never Smoker    Smokeless tobacco: Never Used   Substance Use Topics    Alcohol use:  No                                Counseling given: Not Answered      Vital Signs (Current):   Vitals:    12/09/21 1027 12/13/21 1125   BP:  (!) 154/91   Pulse:  93   Resp:  20   Temp:  97.3 °F (36.3 °C)   TempSrc:  Temporal   SpO2:  97%   Weight: 190 lb (86.2 kg) 190 lb (86.2 kg)   Height: 5' 9\" (1.753 m) 5' 9\" (1.753 m)                                              BP Readings from Last 3 Encounters:   12/13/21 (!) 154/91   11/08/21 128/83   10/29/21 (!) 125/53       NPO Status: Time of last liquid consumption: 2300                        Time of last solid consumption: 2300                        Date of last liquid consumption: 12/12/21                        Date of last solid food consumption: 12/12/21    BMI:   Wt Readings from Last 3 Encounters:   12/13/21 190 lb (86.2 kg)   11/08/21 197 lb (89.4 kg)   10/29/21 180

## 2021-12-13 NOTE — H&P
Yung Barnes was seen and re-examined preoperatively today, December 13, 2021. There was no substantial change in her physical and medical status. Patient is fit for the proposed surgical procedure. All questions were appropriately addressed and had no further questions regarding the risks, benefits, and alternatives of the procedure. Yung Barnes and family wished to proceed.     Paris Mcbride DO  Resident Physician  [facility])  Otolaryngology Residency  12/13/2021  10:16 AM

## 2021-12-13 NOTE — OP NOTE
Operative Note      Patient: Charlie Rodriguez  YOB: 1995  MRN: [id number]    Date of Procedure: 12/13/2021    Pre-Op Diagnosis: NASAL VALVE COLLAPSE JUSTINA BULLOSA INFERIOR TURBINATE HYPERTROPHY EUSTACHIAN TUBE DYSFUNCTION    Post-Op Diagnosis: Same       Procedure(s):  RIGHT NASAL VALVE COLLAPSE SUBMUCOUS RESECTION OF INFERIOR TURBINOPLASTY  REDUCTION JUSTINA BULLOSA REPAIR NASAL SEPTAL PERFORATION  EUSTACHIAN TUBE DILATION    Surgeon(s):  Olive Fermin DO    Assistant:   Resident: Tiesha Eric DO    Anesthesia: General    Estimated Blood Loss (mL): Minimal    Complications: None    Specimens:   * No specimens in log *    Implants:  * No implants in log *      Drains: * No LDAs found *    Findings: enlarged inferior turbinates    Detailed Description of Procedure:    Prep  The patient was consented preoperatively and taken back to the operating room, identified appropriately, placed in the supine position, given anesthesia for general intubation. The patient was intubated. They were prepped and draped in a sterile fashion. Initial prep for the nose was 4% cocaine pledgets placed into both nasal cavities and the patient's nasal walls medial and lateral along the septal line and then along the inferior turbinate were injected with approximately 9 mL of 1% lidocaine with epinephrine. That was total for both sides. The pledgets were allowed to sit for approximately 5 minutes, while the rest of the patient's prep was done. The patient's right ear was prepped and draped in the usual sterile fashion as well. Batten graft  Right ear was injected with 1% lidocaine with epinephrine. A 1 cm incision was made along the posterior helical fold. Cartilage was dissected using an iris scissors and a 1 x 1 cm cartilage graft was harvested. Pyramidal incision was made in right nares and a pocket was created. The graft was secured in place with a 4-0 chromic stitch.      Injection  The 30 degree endoscope was place into the left nostril along with a 10cc syringe with a spinal needle. The anterior root of the middle turbinate was injected with 2cc of 1% lidocaine with epinephrine. The right anterior root was also injected with the same amount. The posterior root of the middle turbinates were then addressed by injecting both sides with 2cc of 1% lidocaine with epinephrine. Left ETD  A 30-degree endoscope was inserted into the left nasal cavity. It was  advanced with the balloon unit to the posterior nasopharynx where the  orifice of the eustachian tube was evident on the left side. The balloon  was then inserted into the eustachian tube and advanced until a hard stop  was felt. The yellow band on the eustachian tube unit was noted to be just  exterior to the eustachian tube lumen. The eustachian tube balloon was  then inflated to 12 atmospheres pressure and held for 2 minutes. The  balloon unit was observed to dilate the eustachian tube and then was let  down. The balloon unit was then removed. Right ETD  A 30-degree endoscope was inserted into the right nasal cavity. It was  advanced with the balloon unit to the posterior nasopharynx where the  orifice of the eustachian tube was evident on the right side. The balloon  was then inserted into the eustachian tube and advanced until a hard stop  was felt. The yellow band on the eustachian tube unit was noted to be just  exterior to the eustachian tube lumen. The eustachian tube balloon was  then inflated to 12 atmospheres pressure and held for 2 minutes. The  balloon unit was observed to dilate the eustachian tube and then was let  down. The balloon unit was then removed. Septal repair  A #15 blade was used to incise the mucosa directly around the small perforation in the anterior septum so as to freshen the edges of the mucosa.  3-0 chromic suture was then used to place quilting stitches in a vertical fashion to approximate the edges of the mucosa and close the perforation. Nadiya bullosa reduction  Zane forceps were used to squash bilateral middle turbinates until the nadiya bullosa was appreciated to be dissipated. Then surgicel was placed lateral to the root of bilateral middle turbinates to medialize them. SMRITS  The bilateral inferior turbinates were medialized with a boies elevator. A syringe with saline and a 22 gauge spinal needle was then used to inject the left turbinate mucosa to allow proper coblation. Using an arthrocare Reflex wand, a small puncture hole was made with the instrument in the anterior portion of the left inferior turbinate. The instrument was advanced along the bone of the turbinate, elevating the periostium from the mucosa. The mucosa was then ablated until a thin mucosal layer was left over the bone. Multiple sites were made going from superior to inferior. The turbinate was then lateralized with a boies elevator. Attention was turned to the right side. A syringe with saline and a 22 gauge spinal needle was then used to inject the right turbinate mucosa to allow proper coblationUsing an arthrocare Reflex wand, a small puncture hole was made with the instrument in the anterior portion of the left inferior turbinate. The instrument was advanced along the bone of the turbinate, elevating the periostium from the mucosa. The mucosa was then ablated until a thin mucosal layer was left over the bone. Multiple sites were made going from superior to inferior. The turbinate was then lateralized again with a boies elevator. Dressing  Quintana splints were placed in the nose and sewn into position . The patient   was then turned back to Anesthesia for appropriate awakening. The patient   tolerated the procedure well. A mustache dressing was placed at the end of the case.      Dr. Casie Arce was scrubbed for the entire case    Electronically signed by Meredith Lees DO on 12/13/2021 at 3:11 PM

## 2021-12-13 NOTE — ANESTHESIA POSTPROCEDURE EVALUATION
Department of Anesthesiology  Postprocedure Note    Patient: Jace Jovel  MRN: [id number]  YOB: 1995  Date of evaluation: 12/13/2021  Time:  3:47 PM     Procedure Summary     Date: 12/13/21 Room / Location: [facility] 01 / [facility]    Anesthesia Start: 1316 Anesthesia Stop: 3945    Procedures:       RIGHT NASAL VALVE COLLAPSE SUBMUCOUS RESECTION OF INFERIOR TURBINOPLASTY (N/A Nose)      REDUCTION JUSTINA BULLOSA REPAIR NASAL SEPTAL PERFORATION (N/A Nose)      EUSTACHIAN TUBE DILATION (N/A Nose) Diagnosis: (NASAL VALVE COLLAPSE JUSTINA BULLOSA INFERIOR TURBINATE HYPERTROPHY EUSTACHIAN TUBE DYSFUNCTION)    Surgeons: Addis Alexander DO Responsible Provider: Maite Raya MD    Anesthesia Type: general ASA Status: 2          Anesthesia Type: general    Gaurang Phase I: Gaurang Score: 10    Gaurang Phase II:      Last vitals: Reviewed and per EMR flowsheets.        Anesthesia Post Evaluation    Patient location during evaluation: PACU  Patient participation: complete - patient participated  Level of consciousness: awake  Pain score: 3  Airway patency: patent  Nausea & Vomiting: no nausea  Complications: no  Cardiovascular status: blood pressure returned to baseline  Respiratory status: acceptable  Hydration status: euvolemic

## 2021-12-14 NOTE — PROGRESS NOTES
CLINICAL PHARMACY NOTE: MEDS TO BEDS    Total # of Prescriptions Filled: 2   The following medications were delivered to the patient:  · norco 7.5mg  · Ondansetron 4mg    Additional Documentation:

## 2021-12-15 ENCOUNTER — TELEPHONE (OUTPATIENT)
Dept: ADMINISTRATIVE | Age: 26
End: 2021-12-15

## 2021-12-21 ENCOUNTER — OFFICE VISIT (OUTPATIENT)
Dept: ENT CLINIC | Age: 26
End: 2021-12-21

## 2021-12-21 VITALS
HEART RATE: 88 BPM | SYSTOLIC BLOOD PRESSURE: 130 MMHG | WEIGHT: 190 LBS | DIASTOLIC BLOOD PRESSURE: 90 MMHG | BODY MASS INDEX: 28.14 KG/M2 | TEMPERATURE: 97.7 F | OXYGEN SATURATION: 96 % | HEIGHT: 69 IN

## 2021-12-21 DIAGNOSIS — Z98.890 S/P NASAL SEPTOPLASTY: Primary | ICD-10-CM

## 2021-12-21 PROCEDURE — 99024 POSTOP FOLLOW-UP VISIT: CPT | Performed by: OTOLARYNGOLOGY

## 2021-12-21 NOTE — PROGRESS NOTES
orders as documented by the  resident              Remainder of medical problems as per  resident note. Patient seen and examined. Agree with above exam, assessment and plan.       Electronically signed by Argelia Delgado DO on 12/27/21 at 12:00 PM EST

## 2022-04-05 ENCOUNTER — OFFICE VISIT (OUTPATIENT)
Dept: ENT CLINIC | Age: 27
End: 2022-04-05
Payer: COMMERCIAL

## 2022-04-05 VITALS
BODY MASS INDEX: 28.14 KG/M2 | WEIGHT: 190 LBS | HEART RATE: 74 BPM | OXYGEN SATURATION: 99 % | SYSTOLIC BLOOD PRESSURE: 130 MMHG | DIASTOLIC BLOOD PRESSURE: 84 MMHG | TEMPERATURE: 97.9 F | HEIGHT: 69 IN

## 2022-04-05 DIAGNOSIS — J34.89 NASAL VALVE COLLAPSE: ICD-10-CM

## 2022-04-05 DIAGNOSIS — J30.1 ACUTE ALLERGIC RHINITIS DUE TO POLLEN: ICD-10-CM

## 2022-04-05 DIAGNOSIS — Z98.890 S/P NASAL SEPTOPLASTY: Primary | ICD-10-CM

## 2022-04-05 DIAGNOSIS — J34.89 CONCHA BULLOSA: ICD-10-CM

## 2022-04-05 PROCEDURE — G8419 CALC BMI OUT NRM PARAM NOF/U: HCPCS | Performed by: OTOLARYNGOLOGY

## 2022-04-05 PROCEDURE — 99213 OFFICE O/P EST LOW 20 MIN: CPT | Performed by: OTOLARYNGOLOGY

## 2022-04-05 PROCEDURE — G8427 DOCREV CUR MEDS BY ELIG CLIN: HCPCS | Performed by: OTOLARYNGOLOGY

## 2022-04-05 PROCEDURE — 1036F TOBACCO NON-USER: CPT | Performed by: OTOLARYNGOLOGY

## 2022-04-05 RX ORDER — FEXOFENADINE HCL 180 MG/1
180 TABLET ORAL DAILY
Qty: 30 TABLET | Refills: 0 | Status: SHIPPED | OUTPATIENT
Start: 2022-04-05 | End: 2022-05-05

## 2022-04-05 RX ORDER — MONTELUKAST SODIUM 10 MG/1
10 TABLET ORAL DAILY
Qty: 30 TABLET | Refills: 3 | Status: SHIPPED | OUTPATIENT
Start: 2022-04-05

## 2022-04-05 NOTE — PROGRESS NOTES
Subjective:      Patient ID: Rashawn Bowles is a 32 y.o. female. HPI Comments: Pt returns for recheck Septo/SMRIT, nadiya bullosa reduction, nasal valve collapse and eustachian tube dilation 4 months ago  She is doing well, had allergy testing a few years ago but never was able to pursue immunotherapy and is interested in starting that. Needs refills on allergy medications. Other  Associated symptoms include congestion. Review of Systems   HENT: Positive for congestion and postnasal drip. All other systems reviewed and are negative. Objective:   Physical Exam   Constitutional: He is oriented to person, place, and time. He appears well-developed and well-nourished. HENT:   Head: Normocephalic and atraumatic. Right Ear: Hearing, tympanic membrane, external ear and ear canal normal.   Left Ear: Hearing, tympanic membrane, external ear and ear canal normal.   Mouth/Throat: Uvula is midline, oropharynx is clear and moist and mucous membranes are normal.   Eyes: Conjunctivae and EOM are normal. Pupils are equal, round, and reactive to light. Neck: Normal range of motion. Neck supple. Cardiovascular: Normal rate, regular rhythm and normal heart sounds. Pulmonary/Chest: Effort normal and breath sounds normal.   Abdominal: Soft. Bowel sounds are normal.   Neurological: He is alert and oriented to person, place, and time. Vitals reviewed. Pathology:  none    Assessment:       Diagnosis Orders   1. S/P nasal septoplasty     2. Nadiya bullosa     3. Nasal valve collapse     4. Acute allergic rhinitis due to pollen  External Referral To Allergy              Plan:     Continue saline spray daily  Allergy meds refilled  Recommend allergy referral- pt was tested with many + allergens, interested in immunotherapy   Follow up in 6 months         Rashawn Bowles  1995    I have discussed the case, including pertinent history and exam findings with the resident.  I have seen and examined the patient and the key elements of the encounter have been performed by me. I agree with the assessment, plan and orders as documented by the  resident              Remainder of medical problems as per  resident note. Patient seen and examined. Agree with above exam, assessment and plan.       Electronically signed by Skylar Schmid DO on 4/11/22 at 7:44 AM EDT

## 2022-04-06 ENCOUNTER — TELEPHONE (OUTPATIENT)
Dept: ENT CLINIC | Age: 27
End: 2022-04-06

## 2022-07-19 NOTE — TELEPHONE ENCOUNTER
Due to insurance company not receiving CT scan results and the progress note did not address actual surgery to be performed this case was withdrawn    A new auth. , will need started    Ct scan send     to revise his progress note. room air

## 2022-09-13 ENCOUNTER — TELEPHONE (OUTPATIENT)
Dept: ENT CLINIC | Age: 27
End: 2022-09-13

## 2022-09-13 NOTE — TELEPHONE ENCOUNTER
Dr. Andrea Del Rosario office called in today regarding the referral we sent over for patient. Patient no showed the appointment with their offica and when they attempted to reschedule patient, she informed them she didn't ant to schedule with them.      Electronically signed by Alfonso Park on 9/13/2022 at 11:08 AM

## 2022-09-13 NOTE — TELEPHONE ENCOUNTER
Referral is closed and documented.      Electronically signed by Real Alberts MA on 9/13/22 at 11:14 AM EDT

## 2023-04-19 ENCOUNTER — TELEPHONE (OUTPATIENT)
Dept: ENT CLINIC | Age: 28
End: 2023-04-19

## 2023-04-19 NOTE — TELEPHONE ENCOUNTER
Patient' mom Dr Becerra Letters- spoke with Dr Beatrice Woodard about patient and got her ear drops for her bilateral ear pain. She said that she completed an antibiotic and used the drops but they have not worked and she is still experiencing ear pain. She wants to be added to schedule to be seen by Dr Beatrice Woodard. Please contact patient to schedule as no soon appointments are available.

## 2023-04-21 ENCOUNTER — PROCEDURE VISIT (OUTPATIENT)
Dept: AUDIOLOGY | Age: 28
End: 2023-04-21

## 2023-04-21 ENCOUNTER — OFFICE VISIT (OUTPATIENT)
Dept: ENT CLINIC | Age: 28
End: 2023-04-21
Payer: COMMERCIAL

## 2023-04-21 VITALS
DIASTOLIC BLOOD PRESSURE: 77 MMHG | WEIGHT: 185 LBS | SYSTOLIC BLOOD PRESSURE: 123 MMHG | BODY MASS INDEX: 27.4 KG/M2 | TEMPERATURE: 97.7 F | HEIGHT: 69 IN | OXYGEN SATURATION: 98 % | HEART RATE: 103 BPM

## 2023-04-21 DIAGNOSIS — M26.609 TMJ (TEMPOROMANDIBULAR JOINT DISORDER): ICD-10-CM

## 2023-04-21 DIAGNOSIS — Z98.890 S/P NASAL SEPTOPLASTY: ICD-10-CM

## 2023-04-21 DIAGNOSIS — J30.1 ACUTE ALLERGIC RHINITIS DUE TO POLLEN: Primary | ICD-10-CM

## 2023-04-21 DIAGNOSIS — H92.03 EAR PAIN, BILATERAL: Primary | ICD-10-CM

## 2023-04-21 PROCEDURE — 99213 OFFICE O/P EST LOW 20 MIN: CPT | Performed by: OTOLARYNGOLOGY

## 2023-04-21 RX ORDER — GUAIFENESIN 600 MG/1
1200 TABLET, EXTENDED RELEASE ORAL 2 TIMES DAILY
Qty: 40 TABLET | Refills: 0 | Status: SHIPPED | OUTPATIENT
Start: 2023-04-21 | End: 2023-05-01

## 2023-04-21 RX ORDER — CIPROFLOXACIN 500 MG/1
500 TABLET, FILM COATED ORAL 2 TIMES DAILY
COMMUNITY

## 2023-04-21 RX ORDER — FLUTICASONE PROPIONATE 50 MCG
2 SPRAY, SUSPENSION (ML) NASAL DAILY
Qty: 16 G | Refills: 5 | Status: SHIPPED | OUTPATIENT
Start: 2023-04-21

## 2023-04-21 RX ORDER — MONTELUKAST SODIUM 10 MG/1
10 TABLET ORAL DAILY
Qty: 30 TABLET | Refills: 3 | Status: SHIPPED | OUTPATIENT
Start: 2023-04-21

## 2023-04-21 ASSESSMENT — ENCOUNTER SYMPTOMS
ALLERGIC/IMMUNOLOGIC NEGATIVE: 1
COUGH: 1
RHINORRHEA: 1
EYES NEGATIVE: 1

## 2023-04-21 NOTE — PROGRESS NOTES
This patient was referred for audiometric/tympanometric testing by Dr. Martha Clark due to bilateral ear pain. Tympanometry revealed normal middle ear peak pressure and compliance, in the left ear. Right ear revealed normal compliance with shallow pressure (0.26 ml). The results were reviewed with the patient and physician. Recommendations for follow up will be made pending physician consult.     Ari Ramirze, Ol[place]/CCC-A  [place] Lic # [id number]

## 2023-05-13 DIAGNOSIS — J30.1 ACUTE ALLERGIC RHINITIS DUE TO POLLEN: Primary | ICD-10-CM

## 2023-05-15 RX ORDER — MONTELUKAST SODIUM 10 MG/1
TABLET ORAL
Qty: 30 TABLET | Refills: 3 | Status: SHIPPED | OUTPATIENT
Start: 2023-05-15

## 2023-05-15 RX ORDER — FLUTICASONE PROPIONATE 50 MCG
SPRAY, SUSPENSION (ML) NASAL
Qty: 1 EACH | Refills: 5 | Status: SHIPPED | OUTPATIENT
Start: 2023-05-15

## 2023-05-15 NOTE — TELEPHONE ENCOUNTER
LOV: 4/21/23, no upcoming appt.     Electronically signed by Nick Hong, [facility] Dinorah Fernandez on 5/15/23 at 8:12 AM EDT

## (undated) DEVICE — REFLEX ULTRA 45 WITH INTEGRATED CABLE: Brand: COBLATION

## (undated) DEVICE — MEDI-VAC NON-CONDUCTIVE SUCTION TUBING: Brand: CARDINAL HEALTH

## (undated) DEVICE — SOLUTION IV AD PED 150ML 0.9% SOD CHL INJ USP PAB

## (undated) DEVICE — COMFORT-CONE™ SURGICAL MASK: Brand: PRECEPT®

## (undated) DEVICE — GARMENT COMPR STD FOR 17IN CALF UNIF THER FLOTRN

## (undated) DEVICE — MARKER SURG SKIN FULL SZ PUR TRAD INK REGULAR ULTRA FN TWIN

## (undated) DEVICE — ELECTRODE PT RET AD L9FT HI MOIST COND ADH HYDRGEL CORDED

## (undated) DEVICE — CAMERA STRYKER 1488

## (undated) DEVICE — SOLUTION IV 500ML 0.9% SOD CHL PH 5 INJ USP VIAFLX PLAS

## (undated) DEVICE — DEVICE INFL PRSS G INDIC DISP (MUST BE PURC IN MULTIPLES OF 5)

## (undated) DEVICE — SPLINT 1524055 DOYLE II AIRWAY SET: Brand: DOYLE II ™

## (undated) DEVICE — SUTURE MCRYL SZ 5-0 L18IN ABSRB UD L13MM P-3 3/8 CIR PRIM Y493G

## (undated) DEVICE — SET SINUS SCOPE

## (undated) DEVICE — HANDLE CVR PATENTED RETENTION DISC STRL LIGHT SHLD

## (undated) DEVICE — PAD,NON-ADHERENT,3X8,STERILE,LF,1/PK: Brand: MEDLINE

## (undated) DEVICE — DOUBLE BASIN SET: Brand: MEDLINE INDUSTRIES, INC.

## (undated) DEVICE — PEN: MARKING STD 100/CS: Brand: MEDICAL ACTION INDUSTRIES

## (undated) DEVICE — CONTROL SYRINGE LUER-LOCK TIP: Brand: MONOJECT

## (undated) DEVICE — NEEDLE HYPO 27GA L1.25IN GRY POLYPR HUB S STL REG BVL STR

## (undated) DEVICE — GAUZE,SPONGE,4"X4",12PLY,STERILE,LF,2'S: Brand: MEDLINE

## (undated) DEVICE — STERILE LATEX POWDER-FREE SURGICAL GLOVESWITH NITRILE COATING: Brand: PROTEXIS

## (undated) DEVICE — LIGHT SOURCE WHT

## (undated) DEVICE — SOLUTION IV IRRIG POUR BRL 0.9% SODIUM CHL 2F7124

## (undated) DEVICE — GLOVE ORANGE PI 7 1/2   MSG9075

## (undated) DEVICE — SYRINGE EAR 2OZ ULC SLIMMER TIP FLAT BTM SUCT PWR DISP FOR

## (undated) DEVICE — SUTURE MCRYL SZ 5-0 L18IN ABSRB VLT P-3 L13MM 3/8 CIR REV Y463G

## (undated) DEVICE — SCOPE 0 DEGREE 4MM

## (undated) DEVICE — INTENDED FOR TISSUE SEPARATION, AND OTHER PROCEDURES THAT REQUIRE A SHARP SURGICAL BLADE TO PUNCTURE OR CUT.: Brand: BARD-PARKER ® STAINLESS STEEL BLADES

## (undated) DEVICE — SUTURE VCRL SZ 5-0 L18IN ABSRB UD L13MM P-3 3/8 CIR PRIM J493G

## (undated) DEVICE — NEEDLE FLTR 18GA L1.5IN MEM THK5UM BLNT DISP

## (undated) DEVICE — 4-PORT MANIFOLD: Brand: NEPTUNE 2

## (undated) DEVICE — KIT SURG PREP POVIDONE IOD PRESATURATED PAINT WET FOR UNIV

## (undated) DEVICE — AGENT HEMSTAT W4XL8IN OXIDIZED REGENERATED CELOS ABSRB

## (undated) DEVICE — SUTURE CHROMIC GUT SZ 4-0 L18IN ABSRB BRN L13MM P-3 3/8 CIR 1654G

## (undated) DEVICE — BASIC SINGLE BASIN 1-LF: Brand: MEDLINE INDUSTRIES, INC.

## (undated) DEVICE — 3M™ STERI-STRIP™ REINFORCED ADHESIVE SKIN CLOSURES, R1541, 1/4 IN X 3 IN (6 MM X 75 MM), 3 STRIPS/ENVELOPE: Brand: 3M™ STERI-STRIP™

## (undated) DEVICE — 3M™ STERI-STRIP™ REINFORCED ADHESIVE SKIN CLOSURES, R1547, 1/2 IN X 4 IN (12 MM X 100 MM), 6 STRIPS/ENVELOPE: Brand: 3M™ STERI-STRIP™

## (undated) DEVICE — Device

## (undated) DEVICE — MARKER,SKIN,WI/RULER AND LABELS: Brand: MEDLINE

## (undated) DEVICE — SUTURE PROL SZ 3-0 L18IN NONABSORBABLE BLU L19MM PS-2 3/8 8687H

## (undated) DEVICE — SUTURE ABSORBABLE MONOFILAMENT 6-0 G-1 18 IN PLN GUT 770G

## (undated) DEVICE — STANDARD SURGICAL GOWN, L: Brand: CONVERTORS

## (undated) DEVICE — SUTURE PDS II SZ 5-0 L18IN ABSRB UD P-3 L13MM 3/8 CIR PRIM Z493G

## (undated) DEVICE — NEEDLE HYPO 18GA L1.5IN PNK POLYPR HUB S STL THN WALL FILL

## (undated) DEVICE — TRAY SINUS INST EXTRAS REUSABLE

## (undated) DEVICE — 1 ML TUBERCULIN SYRINGE LUER-LOCK TIP: Brand: MONOJECT

## (undated) DEVICE — CODMAN® SURGICAL PATTIES 1/2" X 3" (1.27CM X 7.62CM): Brand: CODMAN®

## (undated) DEVICE — 20 ML SYRINGE REGULAR TIP: Brand: MONOJECT

## (undated) DEVICE — BUR 1883260 MICROBUR 5PK TARDY 3.2MM: Brand: MICROBUR®

## (undated) DEVICE — SET NASAL

## (undated) DEVICE — PAD,NON-ADHERENT,2X3,STERILE,LF,1/PK: Brand: MEDLINE

## (undated) DEVICE — TUBE NASOPHARYNGEAL W/ BALLOON CATH

## (undated) DEVICE — GLOVE ORANGE PI 8 1/2   MSG9085

## (undated) DEVICE — COUNTER NDL 30 COUNT DBL MAG

## (undated) DEVICE — E-Z CLEAN, NON-STICK, PTFE COATED, ELECTROSURGICAL BLADE ELECTRODE, 2.5 INCH (6.35 CM): Brand: EZ CLEAN

## (undated) DEVICE — ELECTROSURGICAL PENCIL BUTTON SWITCH E-Z CLEAN COATED BLADE ELECTRODE 10 FT (3 M) CORD HOLSTER: Brand: MEGADYNE

## (undated) DEVICE — KIT,ANTI FOG,W/SPONGE & FLUID,SOFT PACK: Brand: MEDLINE

## (undated) DEVICE — MAGNETIC INSTR DRAPE 20X16: Brand: MEDLINE INDUSTRIES, INC.

## (undated) DEVICE — NEEDLE SPNL 25GA L3.5IN BLU HUB S STL REG WALL FIT STYL W/

## (undated) DEVICE — SYRINGE, LUER LOCK, 10ML: Brand: MEDLINE

## (undated) DEVICE — GLOVE SURG SZ 65 L12IN FNGR THK94MIL STD WHT LTX FREE

## (undated) DEVICE — NEEDLE HYPO 30GA L1IN BGE POLYPR HUB S STL REG BVL STR W/O

## (undated) DEVICE — SHEATH 1912010 5PK 4MM/30DEG STORZ XOMED: Brand: ENDO-SCRUB®

## (undated) DEVICE — PACKING 8004007 NEURAY 200PK 13X76MM: Brand: NEURAY ®

## (undated) DEVICE — YANKAUER,OPEN TIP,W/O VENT,STERILE: Brand: MEDLINE INDUSTRIES, INC.

## (undated) DEVICE — HEAD AND NECK PACK: Brand: CONVERTORS

## (undated) DEVICE — NEEDLE HYPO 30GA L0.5IN BGE POLYPR HUB S STL REG BVL STR

## (undated) DEVICE — CAMERA STRYKER STER

## (undated) DEVICE — SPONGE GZ W4XL4IN RAYON POLY FILL CVR W/ NONWOVEN FAB

## (undated) DEVICE — TOWEL,OR,DSP,ST,BLUE,STD,6/PK,12PK/CS: Brand: MEDLINE

## (undated) DEVICE — SYRINGE 20ML LL S/C 50

## (undated) DEVICE — SPLINT NSL L3XW3IN IVRY THERMOPLASTIC STBL

## (undated) DEVICE — RETAINER 16IN ADJ EAR LOOP DRSG NSL NS

## (undated) DEVICE — SOLUTION IV IRRIG WATER 1000ML POUR BRL 2F7114

## (undated) DEVICE — GOWN,SIRUS,FABRNF,XL,20/CS: Brand: MEDLINE

## (undated) DEVICE — NEEDLE SPNL 22GA L3.5IN BLK HUB S STL REG WALL FIT STYL W/

## (undated) DEVICE — COAGULATOR SUCT 10FR LAIN FTSWCH ACTIVATION DISP VALLEYLAB

## (undated) DEVICE — 12 ML SYRINGE,LUER-LOCK TIP: Brand: MONOJECT

## (undated) DEVICE — PENCIL ES CRD L10FT HND SWCHING ROCK SWCH W/ EDGE COAT BLDE

## (undated) DEVICE — SPLINT 1528121 5PK EXTERNAL NASAL MEDIUM

## (undated) DEVICE — TUBING 1912030 ENDO-SCRUB 2 5PK: Brand: ENDO-SCRUB®

## (undated) DEVICE — NEEDLE HYPO 25GA L1.5IN BLU POLYPR HUB S STL REG BVL STR

## (undated) DEVICE — SURGICAL PROCEDURE PACK EENT CUST